# Patient Record
Sex: MALE | Race: WHITE | NOT HISPANIC OR LATINO | Employment: OTHER | URBAN - NONMETROPOLITAN AREA
[De-identification: names, ages, dates, MRNs, and addresses within clinical notes are randomized per-mention and may not be internally consistent; named-entity substitution may affect disease eponyms.]

---

## 2018-06-25 ENCOUNTER — TRANSCRIBE ORDERS (OUTPATIENT)
Dept: PHYSICAL THERAPY | Facility: CLINIC | Age: 68
End: 2018-06-25

## 2018-06-25 DIAGNOSIS — M25.511 RIGHT SHOULDER PAIN, UNSPECIFIED CHRONICITY: Primary | ICD-10-CM

## 2018-06-26 ENCOUNTER — CONSULT (OUTPATIENT)
Dept: PHYSICAL THERAPY | Facility: CLINIC | Age: 68
End: 2018-06-26

## 2018-06-26 DIAGNOSIS — M25.511 RIGHT SHOULDER PAIN, UNSPECIFIED CHRONICITY: Primary | ICD-10-CM

## 2018-06-26 PROCEDURE — G8985 CARRY GOAL STATUS: HCPCS | Performed by: PHYSICAL THERAPIST

## 2018-06-26 PROCEDURE — G8984 CARRY CURRENT STATUS: HCPCS | Performed by: PHYSICAL THERAPIST

## 2018-06-26 PROCEDURE — 97110 THERAPEUTIC EXERCISES: CPT | Performed by: PHYSICAL THERAPIST

## 2018-06-26 PROCEDURE — G0283 ELEC STIM OTHER THAN WOUND: HCPCS | Performed by: PHYSICAL THERAPIST

## 2018-06-26 PROCEDURE — 97161 PT EVAL LOW COMPLEX 20 MIN: CPT | Performed by: PHYSICAL THERAPIST

## 2018-06-26 NOTE — PROGRESS NOTES
Outpatient Physical Therapy Ortho Initial Evaluation       Patient Name: Carlos Zapata  : 1950  MRN: [id number]  Today's Date: 2018      Visit Date: 2018  Visit   Return to MD: 18  Re-cert date: 18  TIME     TIME      There is no problem list on file for this patient.       Past Medical History:   Diagnosis Date   • Arthritis    • Diabetes mellitus    • Hx of heart artery stent    • Hypertension         No past surgical history on file.    Visit Dx:     ICD-10-CM ICD-9-CM   1. Right shoulder pain, unspecified chronicity M25.511 719.41     Meds: Aspirin, Simvastatin, lisinopril, nitrostat, sotalol, hydrochlorothiazide, metformin, glimepiride, Zetia, invokana   Allergies: NKA    Subjective Evaluation    History of Present Illness  Mechanism of injury: 67 yo male with 3 month h/o acute onset R shoulder pain. Insidious onset. Had trouble with laying on right side sleeping at night.       Patient Occupation: retired-[employer] Quality of life: good    Pain  Current pain rating: 3  At best pain ratin  At worst pain ratin  Location: right shoulder  Quality: sharp  Relieving factors: heat  Aggravating factors: overhead activity, outstretched reach, lifting and sleeping (dressing)  Progression: worsening    Social Support  Lives in: one-story house  Lives with: spouse    Hand dominance: left    Diagnostic Tests  X-ray: abnormal    Treatments  Previous treatment: physical therapy  Current treatment: medication and physical therapy  Patient Goals  Patient goals for therapy: increased strength, increased motion and decreased pain                  RUE AROM (degrees)  R Shoulder Flexion  0-170: 121 Degrees  R Shoulder ABduction 0-140: 72 Degrees  R Shoulder Internal Rotation  0-70: 40 Degrees  R Shoulder External Rotation  0-90: 54 Degrees     RUE Strength  R Shoulder Flexion: 4/5  R Shoulder ABduction: 4+/5  R Shoulder Internal Rotation: 5/5  R  "Shoulder External Rotation: 4+/5  R Elbow Flexion: 4/5  R Elbow Extension: 5/5        LUE AROM (degrees)  L Shoulder Flexion  0-170: 145 Degrees  L Shoulder ABduction 0-40: 125 Degrees  L Shoulder Internal Rotation  0-70: 69 Degrees  L Shoulder External Rotation  0-90: 55 Degrees     LUE Strength  L Shoulder Flexion: 5/5  L Shoulder ABduction: 5/5  L Shoulder Internal Rotation: 5/5  L Shoulder External Rotation: 5/5  L Elbow Flexion: 5/5  L Elbow Extension: 5/5                     Body Part  Body Part: Shoulder               Ice  Ice Applied: Yes  Location: R shoulder  Rx Minutes: 15 mins  Ice S/P Rx: Yes    Electrical Stimulation  Stimulation Type: IFC  Location/Electrode Placement/Other: R shoulder  Rx Minutes: 15 mins                   EXERCISE  Exercise 1  Exercise Name 1: supine R shoulder flex AAROM   Sets/Reps 1: 1/20  Exercise 2  Exercise Name 2: supine R shoulder ER/IR w/ dowel  Sets/Reps 2: 20x ea Exercise 3  Exercise Name 3: standing R shoulder ext w/ dowel  Sets/Reps 3: 1/10-increased R shoulder pain Exercise 4  Exercise Name 4: posterior capsule stretch  Sets/Reps 4: 2/30\" hold Exercise 5  Exercise Name 5: resisted R shoulder ext w/ red TB  Time 5: 1/20 Exercise 6  Exercise Name 6: resisted rows with red TB  Sets/Reps 6: 10x-d/c'd due to increased R biceps region pain         Therapeutic ex-12 minutes               Assessment & Plan     Assessment  Impairments: abnormal or restricted ROM, activity intolerance, impaired physical strength, lacks appropriate home exercise program and pain with function  Assessment details: Acute right shoulder pain with AC joint degenerative changes (osteophytes).  Prognosis: good  Functional Limitations: carrying objects, lifting, sleeping, pushing, uncomfortable because of pain and reaching behind back  Goals  Plan Goals: STG's (2-3 weeks)  1. Pt independent with HEP.  2. Improve right shoulder flex/abd AROM to 150° and 100° respectively.  3. Improve right shoulder MMT " to 4+/5.  4. Reduce right shoulder pain to 3-4/10 (at worst) level performing ADL's.  4. Improve right shoulder IR/ER to 55°/70°.    LTG's (4-6 weeks)  1. Improve right shoulder abduction AROM to 130°.  2. Improve right shoulder MMT to 5/5  3. Reduce right shoulder pain to 1-2/10 (at worst) level performing ADL's.  4. Improve right shoulder IR/ER to 70°/80°    Plan  Therapy options: will be seen for skilled physical therapy services  Planned modality interventions: cryotherapy, electrical stimulation/Russian stimulation, thermotherapy (hydrocollator packs) and ultrasound  Planned therapy interventions: manual therapy, postural training, soft tissue mobilization, spinal/joint mobilization, strengthening, stretching, joint mobilization, home exercise program and flexibility  Frequency: 2x week  Duration in weeks: 6  Treatment plan discussed with: patient  Plan details: Address RTC strengthening and improve R shoulder ROM.         Time Calculation: 61            PT G-Codes  Outcome Measure Options: Quick DASH  Score: 36.36  Functional Limitation: Carrying, moving and handling objects  Carrying, Moving and Handling Objects Current Status (): At least 20 percent but less than 40 percent impaired, limited or restricted  Carrying, Moving and Handling Objects Goal Status (): At least 1 percent but less than 20 percent impaired, limited or restricted         Karthik Mar, PT  6/26/2018        Carlos Zapata    1950

## 2018-06-28 ENCOUNTER — TREATMENT (OUTPATIENT)
Dept: PHYSICAL THERAPY | Facility: CLINIC | Age: 68
End: 2018-06-28

## 2018-06-28 DIAGNOSIS — M25.511 RIGHT SHOULDER PAIN, UNSPECIFIED CHRONICITY: Primary | ICD-10-CM

## 2018-06-28 PROCEDURE — G0283 ELEC STIM OTHER THAN WOUND: HCPCS | Performed by: PHYSICAL THERAPIST

## 2018-06-28 PROCEDURE — 97110 THERAPEUTIC EXERCISES: CPT | Performed by: PHYSICAL THERAPIST

## 2018-06-28 NOTE — PROGRESS NOTES
Daily Treatment Note    Time In: 14:46     Time Out: 15:46    ICD-10-CM ICD-9-CM   1. Right shoulder pain, unspecified chronicity M25.511 719.41       Subjective   Pt reports mild R shld pain. He is doing HEP.   Patient reports to therapy 1-2/10 pain, and 0% improvement.  Attendance  2/2 visits. Recert 7/17. MD f/u 7/20.      Objective     V cues necessary for correct TE performance. Post treatment pain decreased.    PROCEDURES AND MODALITIES:     Ice  Ice Applied: Yes  Location: R shoulder  Rx Minutes: 15 mins  Ice S/P Rx: Yes    Electrical Stimulation  Stimulation Type: IFC  Location/Electrode Placement/Other: R shoulder  Rx Minutes: 15 mins       EXERCISE  Exercise 1  Exercise Name 1: Man shld: post mob, inf glide mob, PROM  Time: 8'  Exercise 2  Exercise Name 2: Pulleys  Sets/Reps 2: 2/1' Exercise 3  Exercise Name 3: Bar flex, abd, ext,   Sets/Reps 3: 2/10x Exercise 4  Exercise Name 4: S/L ER  Equipment/Resistance 4: 1#  Sets/Reps 4: 2 sets Exercise 5  Exercise Name 5: Horz abd over PB  Equipment/Resistance 5: 1#  Sets/Reps 5: 2/10x Exercise 6  Exercise Name 6: Seated thoracic ext  Sets/Reps 6: 2/10x   Exercise 7  Exercise Name 7: S/L thoracic rot  Sets/Reps 7: 2/10x ea                                         Therapy Exercise [id number] 45 minutes and Other Procedure CPT 15 minutes Electrical Stimulation    Total Treatment Time: 60 Minutes    Assessment/Plan   Good tolerance of today's treatment. ROM needs increasing. Pt continues to benefit from PT for further progression towards goals.  Progress per Plan of Care             Lucius Milner, PTA  Physical Therapist

## 2018-07-03 ENCOUNTER — TREATMENT (OUTPATIENT)
Dept: PHYSICAL THERAPY | Facility: CLINIC | Age: 68
End: 2018-07-03

## 2018-07-03 DIAGNOSIS — M25.511 RIGHT SHOULDER PAIN, UNSPECIFIED CHRONICITY: Primary | ICD-10-CM

## 2018-07-03 PROCEDURE — 97110 THERAPEUTIC EXERCISES: CPT | Performed by: PHYSICAL THERAPIST

## 2018-07-03 PROCEDURE — G0283 ELEC STIM OTHER THAN WOUND: HCPCS | Performed by: PHYSICAL THERAPIST

## 2018-07-03 NOTE — PROGRESS NOTES
"Daily Treatment Note    Time In: 13:55      Time Out: 14:53    ICD-10-CM ICD-9-CM   1. Right shoulder pain, unspecified chronicity M25.511 719.41       Subjective   Pt reports having bad night last night with pain. No improvement reported thus far.   Patient reports to therapy 5/10 pain, and 0% improvement.  Attendance  3/3 visits. Recert 7/17. MD f/u 7/20.      Objective    C/O ant shld pain with attempt @ rope rows. Post treatment pain decreased.      PROCEDURES AND MODALITIES:     Ice  Ice Applied: Yes  Location: R shoulder  Rx Minutes: 15 mins  Ice S/P Rx: Yes    Electrical Stimulation  Stimulation Type: IFC  Location/Electrode Placement/Other: R shoulder  Rx Minutes: 15 mins       EXERCISE  Exercise 1  Exercise Name 1: Man shld: PROM, mobs  Time: 12'  Exercise 2  Exercise Name 2: Manual pec minor stretch  Sets/Reps 2: 2/30\" Exercise 3  Exercise Name 3: Supine protraction  Equipment/Resistance 3: 5# bar  Sets/Reps 3: 30x Exercise 4  Exercise Name 4: Supine horz abd  Equipment/Resistance 4: red tb  Sets/Reps 4: 2/10x Exercise 5  Exercise Name 5: S/L horz abd  Equipment/Resistance 5: 1#  Sets/Reps 5: 2 sets Exercise 6  Exercise Name 6: Pulleys  Sets/Reps 6: 2'   Exercise 7  Exercise Name 7: Rows  Equipment/Resistance 7: green tb  Sets/Reps 7: 20x Exercise 8  Exercise Name 8: Seated thoracic ext  Sets/Reps 8: 2/10x                                         Therapy Exercise [id number] 43 minutes and Other Procedure CPT 15 minutes Electrical Stimulation    Total Treatment Time: 58 Minutes    Assessment/Plan   Mildy increased shld reactivity today. Adjusted TE 2° this. Pt continues to benefit from PT for further progression towards goals.  Progress per Plan of Care             Lucius Milner, PTA  Physical Therapist    "

## 2018-07-05 ENCOUNTER — TREATMENT (OUTPATIENT)
Dept: PHYSICAL THERAPY | Facility: CLINIC | Age: 68
End: 2018-07-05

## 2018-07-05 DIAGNOSIS — M25.511 RIGHT SHOULDER PAIN, UNSPECIFIED CHRONICITY: Primary | ICD-10-CM

## 2018-07-05 PROCEDURE — G0283 ELEC STIM OTHER THAN WOUND: HCPCS | Performed by: PHYSICAL THERAPIST

## 2018-07-05 PROCEDURE — 97110 THERAPEUTIC EXERCISES: CPT | Performed by: PHYSICAL THERAPIST

## 2018-07-05 NOTE — PROGRESS NOTES
"Daily Treatment Note    Time In: 8:00      Time Out: 8:55    ICD-10-CM ICD-9-CM   1. Right shoulder pain, unspecified chronicity M25.511 719.41       Subjective   Pt reports continued shld pain. No significant status change thus far.   Patient reports to therapy 5/10 pain, and  0% improvement.  Attendance  4/4 visits. Recert 7/17. MD f/u 7/20.      Objective     V cues necessary for correct TE performance. Compensatory patterns observed with horz abd ex.     AROM:    Shld flex 125°, abd 90°     PROCEDURES AND MODALITIES:        Ice  Ice Applied: Yes  Location: R shoulder  Rx Minutes: 15 mins  Ice S/P Rx: Yes    Electrical Stimulation  Stimulation Type: IFC  Location/Electrode Placement/Other: R shoulder  Rx Minutes: 15 mins    EXERCISE  Exercise 1  Exercise Name 1: PRO2  Equipment/Resistance 1: 3.4  Time: 6'  Exercise 2  Exercise Name 2: Manual pec minor stretch  Sets/Reps 2: 2/30\" Exercise 3  Exercise Name 3: Man shld: post mob, inf glide mob, IR/ER  Time 3: 7' Exercise 4  Exercise Name 4: Supine protraction  Equipment/Resistance 4: 5# db  Sets/Reps 4: 2/15x Exercise 5  Exercise Name 5: S/L ER  Equipment/Resistance 5: 2#  Sets/Reps 5: 2/15x Exercise 6  Exercise Name 6: ST horz abd  Equipment/Resistance 6: yellow tb  Sets/Reps 6: 2/10x   Exercise 7  Exercise Name 7: Incline CP  Equipment/Resistance 7: 4# db  Sets/Reps 7: 2 sets Exercise 8  Exercise Name 8: Doorway pec stretch  Sets/Reps 8: 2/30\" Exercise 9  Exercise Name 9: ST bar flexion  Equipment/Resistance 9: 1#  Sets/Reps 9: 2/10x                                     Therapy Exercise [id number] 40 minutes and Other Procedure CPT 15 minutes Electrical Stimulation    Total Treatment Time: 55 Minutes    Assessment/Plan   Mildly increased shld flexion measurement. Slow progress thus far. Good tolerance of today's treatment.   Progress per Plan of Care             Lucius Milner, PTA  Physical Therapist    "

## 2018-07-10 ENCOUNTER — TREATMENT (OUTPATIENT)
Dept: PHYSICAL THERAPY | Facility: CLINIC | Age: 68
End: 2018-07-10

## 2018-07-10 DIAGNOSIS — M25.511 RIGHT SHOULDER PAIN, UNSPECIFIED CHRONICITY: Primary | ICD-10-CM

## 2018-07-10 PROCEDURE — G0283 ELEC STIM OTHER THAN WOUND: HCPCS | Performed by: PHYSICAL THERAPIST

## 2018-07-10 PROCEDURE — 97110 THERAPEUTIC EXERCISES: CPT | Performed by: PHYSICAL THERAPIST

## 2018-07-10 PROCEDURE — 97140 MANUAL THERAPY 1/> REGIONS: CPT | Performed by: PHYSICAL THERAPIST

## 2018-07-10 NOTE — PROGRESS NOTES
"Daily Treatment Note    Time In: 1315     Time Out:  1412    ICD-10-CM ICD-9-CM   1. Right shoulder pain, unspecified chronicity M25.511 719.41       Subjective   Thinks he will need to save therapy in case he has surgery. Recert with Karthik next week for MD progress note.  Patient reports to therapy 2-3/10 pain, and  0% improvement.  Attendance  5/5 visits. Recert 7/17. MD f/u 7/20.      Objective        AROM:        90 abd, ER supine at 90° -80, IR at 70 abd- 45°    PROCEDURES AND MODALITIES:            Ice  Location: R shoulder  Rx Minutes: 15 mins  Ice S/P Rx: Yes    Electrical Stimulation  Stimulation Type: IFC  Location/Electrode Placement/Other: R shoulder  Rx Minutes: 15 mins            EXERCISE    30  mins  Exercise 1  Exercise Name 1: PRO2  Equipment/Resistance 1: 3.4  Time: 6'  Exercise 2  Exercise Name 2: doorway pec minor stretch  Sets/Reps 2: 2/30\" Exercise 3  Exercise Name 3: pulleys  Time 3: 3' Exercise 4  Exercise Name 4: Supine protraction  Sets/Reps 4: 2/15x Exercise 5  Exercise Name 5: S/L ER  Equipment/Resistance 5: 2#  Sets/Reps 5: 2/15x Exercise 6  Exercise Name 6: ST horz abd  Equipment/Resistance 6: yellow tb  Sets/Reps 6: 2/10x   Exercise 7  Exercise Name 7: sleeper stretch  Sets/Reps 7: 3/30 Exercise 8  Exercise Name 8: towel roll pec stretch  Sets/Reps 8: 3' Exercise 9  Exercise Name 9: Supine bar flexion  Equipment/Resistance 9: 1#  Sets/Reps 9: 2/10x  no $ yellow band- 20x                                   MANUAL PT:  Manual Rx 1 Location: GH joint  Manual Rx 1 Type: mobes  Manual Rx 1 Grade: 3-4  Manual Rx 1 Duration: 5'  Manual Rx 2 Location: scapular glides  Manual Rx 2 Duration: 3'  Manual Rx 3 Location: bicep/ UT  Manual Rx 3 Type: MFR  Manual Rx 3 Duration: 2'           Assessment/Plan   Progress ROM and strength as able             Iman Wilson, PT, ATC, DPT  Physical Therapist    "

## 2018-07-12 ENCOUNTER — TREATMENT (OUTPATIENT)
Dept: PHYSICAL THERAPY | Facility: CLINIC | Age: 68
End: 2018-07-12

## 2018-07-12 DIAGNOSIS — M25.511 RIGHT SHOULDER PAIN, UNSPECIFIED CHRONICITY: Primary | ICD-10-CM

## 2018-07-12 PROCEDURE — 97110 THERAPEUTIC EXERCISES: CPT | Performed by: PHYSICAL THERAPIST

## 2018-07-12 PROCEDURE — G0283 ELEC STIM OTHER THAN WOUND: HCPCS | Performed by: PHYSICAL THERAPIST

## 2018-07-12 NOTE — PROGRESS NOTES
Daily Treatment Note    Time In: 7:56      Time Out: 9:02    ICD-10-CM ICD-9-CM   1. Right shoulder pain, unspecified chronicity M25.511 719.41       Subjective   Pt reports feeling the same. He will f/u with MD next week.   Patient reports to therapy 3/10 pain, and  0% improvement.  Attendance  6/6 visits. Recert 7/17. MD f/u 7/20.      Objective     V cues necessary for correct TE performance.    PROCEDURES AND MODALITIES:     Ice  Ice Applied: Yes  Location: R shoulder  Rx Minutes: 15 mins  Ice S/P Rx: Yes    Electrical Stimulation  Stimulation Type: IFC  Max mAmp: 8  Location/Electrode Placement/Other: R shoulder  Rx Minutes: 15 mins       EXERCISE  Exercise 1  Exercise Name 1: PRO2  Equipment/Resistance 1: 4.0  Time: 8'  Exercise 2  Exercise Name 2: Pulleys  Sets/Reps 2: 20x Exercise 3  Exercise Name 3: Man shld: distract, post mob, inf glige mob, PROM, pec minor stretch  Time 3: 18' Exercise 4  Exercise Name 4: Isometric @ end range: IR, ER, flex  Sets/Reps 4: 10x ea Exercise 5  Exercise Name 5: S/L ER  Sets/Reps 5: 30x Exercise 6  Exercise Name 6: B shld ext  Equipment/Resistance 6: yellow tb  Sets/Reps 6: 30x   Exercise 7  Exercise Name 7: Seated thoracic ext  Equipment/Resistance 7: 2 balls  Sets/Reps 7: 3/10x                                           Therapy Exercise [id number] 51 minutes and Other Procedure CPT 15 minutes Electrical Stimulation    Total Treatment Time: 66 Minutes    Assessment/Plan   Min significant status change. Pt to possibly have ortho consult.   Other. MD f/u.              Lucius Milner, PTA  Physical Therapist

## 2018-07-17 ENCOUNTER — OFFICE VISIT (OUTPATIENT)
Dept: ORTHOPEDIC SURGERY | Facility: CLINIC | Age: 68
End: 2018-07-17

## 2018-07-17 VITALS — BODY MASS INDEX: 25.74 KG/M2 | HEIGHT: 75 IN | WEIGHT: 207 LBS

## 2018-07-17 DIAGNOSIS — M75.41 IMPINGEMENT SYNDROME OF RIGHT SHOULDER: Primary | ICD-10-CM

## 2018-07-17 DIAGNOSIS — M25.511 ACUTE PAIN OF RIGHT SHOULDER: ICD-10-CM

## 2018-07-17 PROBLEM — G89.29 CHRONIC RIGHT SHOULDER PAIN: Status: ACTIVE | Noted: 2018-07-17

## 2018-07-17 PROCEDURE — 99214 OFFICE O/P EST MOD 30 MIN: CPT | Performed by: NURSE PRACTITIONER

## 2018-07-17 RX ORDER — LORATADINE 10 MG/1
10 TABLET ORAL AS NEEDED
COMMUNITY

## 2018-07-17 RX ORDER — LISINOPRIL 10 MG/1
10 TABLET ORAL DAILY
COMMUNITY
Start: 2018-06-25 | End: 2019-06-26

## 2018-07-17 RX ORDER — NITROGLYCERIN 0.4 MG/1
TABLET SUBLINGUAL
COMMUNITY
Start: 2017-06-05 | End: 2018-08-30

## 2018-07-17 RX ORDER — GLIMEPIRIDE 4 MG/1
4 TABLET ORAL NIGHTLY
COMMUNITY
Start: 2018-01-24

## 2018-07-17 RX ORDER — HYDROCHLOROTHIAZIDE 25 MG/1
TABLET ORAL
COMMUNITY
Start: 2018-04-10 | End: 2018-08-30

## 2018-07-17 RX ORDER — EZETIMIBE 10 MG/1
10 TABLET ORAL
COMMUNITY
Start: 2018-04-24 | End: 2018-07-24

## 2018-07-17 RX ORDER — SIMVASTATIN 80 MG
80 TABLET ORAL NIGHTLY
COMMUNITY
Start: 2018-07-09 | End: 2019-05-22

## 2018-07-17 RX ORDER — ASPIRIN 325 MG
325 TABLET ORAL NIGHTLY
COMMUNITY

## 2018-07-17 RX ORDER — SOTALOL HYDROCHLORIDE 80 MG/1
80 TABLET ORAL 2 TIMES DAILY
COMMUNITY
Start: 2018-06-25 | End: 2019-06-26

## 2018-07-17 NOTE — PROGRESS NOTES
Carlos Zapata is a 68 y.o. male   Primary provider:  Noman Meyers MD       Chief Complaint   Patient presents with   • Right Shoulder - Shoulder Pain       HISTORY OF PRESENT ILLNESS: right shoulder pain started about 5 months ago, no injury. Patient seen by Dr. Nam had xrays and referred to physical therapy at [facility].     Upper Extremity Issue   This is a chronic problem. The current episode started more than 1 month ago. The problem occurs intermittently. The problem has been unchanged. Associated symptoms comments: Stabbing, aching. . He has tried NSAIDs and ice (physical therapy) for the symptoms.        CONCURRENT MEDICAL HISTORY:    Past Medical History:   Diagnosis Date   • Arthritis    • Diabetes mellitus (CMS/HCC)    • Hx of heart artery stent 2000   • Hypertension        No Known Allergies      Current Outpatient Prescriptions:   •  aspirin 325 MG tablet, Take 325 mg by mouth., Disp: , Rfl:   •  Canagliflozin (INVOKANA) 100 MG tablet, Take 100 mg by mouth., Disp: , Rfl:   •  ezetimibe (ZETIA) 10 MG tablet, Take 10 mg by mouth., Disp: , Rfl:   •  glimepiride (AMARYL) 4 MG tablet, , Disp: , Rfl:   •  hydrochlorothiazide (HYDRODIURIL) 25 MG tablet, Take 1/2 tablet every morning, Disp: , Rfl:   •  lisinopril (PRINIVIL,ZESTRIL) 10 MG tablet, Take 10 mg by mouth., Disp: , Rfl:   •  loratadine (CLARITIN) 10 MG tablet, Take 10 mg by mouth., Disp: , Rfl:   •  metFORMIN (GLUCOPHAGE) 1000 MG tablet, Take 1,000 mg by mouth., Disp: , Rfl:   •  nitroglycerin (NITROSTAT) 0.4 MG SL tablet, Dissolve 1 tablet under the tongue every 5 minutes up  to 3 doses in 15 min period as needed for chest pain.  Call 911 if unresolved, Disp: , Rfl:   •  simvastatin (ZOCOR) 80 MG tablet, Take 80 mg by mouth., Disp: , Rfl:   •  sotalol (BETAPACE) 80 MG tablet, Take 80 mg by mouth., Disp: , Rfl:     Past Surgical History:   Procedure Laterality Date   • KNEE SURGERY Right 2015?   • SHOULDER SURGERY Left 2015?  "  • TONSILLECTOMY  1958       Family History   Problem Relation Age of Onset   • Hypertension Other    • Diabetes Other         Social History     Social History   • Marital status:      Spouse name: N/A   • Number of children: N/A   • Years of education: N/A     Occupational History   • Not on file.     Social History Main Topics   • Smoking status: Never Smoker   • Smokeless tobacco: Never Used   • Alcohol use No   • Drug use: No   • Sexual activity: Not on file     Other Topics Concern   • Not on file     Social History Narrative   • No narrative on file        Review of Systems   Constitutional: Negative.    HENT: Negative.    Eyes: Negative.    Respiratory: Negative.    Cardiovascular: Negative.    Gastrointestinal: Negative.    Endocrine: Negative.    Genitourinary: Negative.    Musculoskeletal: Negative.    Skin: Negative.    Allergic/Immunologic: Negative.    Neurological: Negative.    Hematological: Negative.    Psychiatric/Behavioral: Negative.        PHYSICAL EXAMINATION:       Ht 189.2 cm (74.5\")   Wt 93.9 kg (207 lb)   BMI 26.22 kg/m²     Physical Exam   Constitutional: He is oriented to person, place, and time. Vital signs are normal. He appears well-developed and well-nourished. He is cooperative.   HENT:   Head: Normocephalic and atraumatic.   Neck: Trachea normal and phonation normal.   Pulmonary/Chest: Effort normal. No respiratory distress.   Abdominal: Soft. Normal appearance. He exhibits no distension.   Neurological: He is alert and oriented to person, place, and time. GCS eye subscore is 4. GCS verbal subscore is 5. GCS motor subscore is 6.   Skin: Skin is warm, dry and intact.   Psychiatric: He has a normal mood and affect. His speech is normal and behavior is normal. Judgment and thought content normal. Cognition and memory are normal.   Vitals reviewed.      GAIT:     [x]  Normal  []  Antalgic    Assistive device: [x]  None  []  Walker     []  Crutches  []  Cane     []  " Wheelchair  []  Stretcher    Right Shoulder Exam     Tenderness   The patient is experiencing tenderness in the acromioclavicular joint.    Range of Motion   Active Abduction: abnormal   Forward Flexion: abnormal   External Rotation: abnormal   Internal Rotation 90 degrees: abnormal     Muscle Strength   Abduction: 4/5   Internal Rotation: 4/5   External Rotation: 4/5   Supraspinatus: 4/5     Tests   Drop Arm: positive  Impingement: positive    Other   Erythema: absent  Scars: absent  Sensation: normal  Pulse: present      Left Shoulder Exam   Left shoulder exam is normal.              X-ray shoulder right 2 or more views6/20/2018  [facility]  Result Impression     Negative right shoulder.    [id number]   Result Narrative   Right shoulder, 3 views    Indication: Pain, decreased range of motion.    Indication: No fracture or dislocation is seen in the right shoulder. The glenohumeral and acromioclavicular joints are unremarkable. Included upper right ribs are within normal limits. A small osteophyte is seen at the inferomedial humeral head. Tiny   osteophytes are seen at the AC joint.   Other Result Information   Zach, Rad Results In - 06/20/2018  3:24 PM CDT  Right shoulder, 3 views    Indication: Pain, decreased range of motion.    Indication: No fracture or dislocation is seen in the right shoulder. The glenohumeral and acromioclavicular joints are unremarkable. Included upper right ribs are within normal limits. A small osteophyte is seen at the inferomedial humeral head. Tiny   osteophytes are seen at the AC joint.    IMPRESSION:     Negative right shoulder.    [id number]             ASSESSMENT:    Diagnoses and all orders for this visit:    Impingement syndrome of right shoulder  -     MRI Shoulder Right Without Contrast; Future    Acute pain of right shoulder  -     MRI Shoulder Right Without Contrast; Future    Other orders  -     aspirin 325 MG tablet; Take 325 mg by mouth.  -     loratadine  (CLARITIN) 10 MG tablet; Take 10 mg by mouth.  -     simvastatin (ZOCOR) 80 MG tablet; Take 80 mg by mouth.  -     lisinopril (PRINIVIL,ZESTRIL) 10 MG tablet; Take 10 mg by mouth.  -     nitroglycerin (NITROSTAT) 0.4 MG SL tablet; Dissolve 1 tablet under the tongue every 5 minutes up  to 3 doses in 15 min period as needed for chest pain.  Call 911 if unresolved  -     Canagliflozin (INVOKANA) 100 MG tablet; Take 100 mg by mouth.  -     sotalol (BETAPACE) 80 MG tablet; Take 80 mg by mouth.  -     hydrochlorothiazide (HYDRODIURIL) 25 MG tablet; Take 1/2 tablet every morning  -     metFORMIN (GLUCOPHAGE) 1000 MG tablet; Take 1,000 mg by mouth.  -     glimepiride (AMARYL) 4 MG tablet;   -     ezetimibe (ZETIA) 10 MG tablet; Take 10 mg by mouth.          PLAN  Recommend MRI of the right shoulder to rule out a rotator cuff tear.  No Follow-up on file.    Tan Rodriguez, APRN

## 2018-07-25 DIAGNOSIS — M25.511 ACUTE PAIN OF RIGHT SHOULDER: ICD-10-CM

## 2018-07-25 DIAGNOSIS — M75.41 IMPINGEMENT SYNDROME OF RIGHT SHOULDER: ICD-10-CM

## 2018-07-30 ENCOUNTER — OFFICE VISIT (OUTPATIENT)
Dept: ORTHOPEDIC SURGERY | Facility: CLINIC | Age: 68
End: 2018-07-30

## 2018-07-30 VITALS — WEIGHT: 205 LBS | BODY MASS INDEX: 25.49 KG/M2 | HEIGHT: 75 IN

## 2018-07-30 DIAGNOSIS — M75.41 IMPINGEMENT SYNDROME OF RIGHT SHOULDER: Primary | ICD-10-CM

## 2018-07-30 DIAGNOSIS — M25.511 ACUTE PAIN OF RIGHT SHOULDER: ICD-10-CM

## 2018-07-30 PROCEDURE — 20610 DRAIN/INJ JOINT/BURSA W/O US: CPT | Performed by: NURSE PRACTITIONER

## 2018-07-30 PROCEDURE — 99213 OFFICE O/P EST LOW 20 MIN: CPT | Performed by: NURSE PRACTITIONER

## 2018-07-30 RX ADMIN — LIDOCAINE HYDROCHLORIDE 2 ML: 20 INJECTION, SOLUTION INFILTRATION; PERINEURAL at 13:30

## 2018-07-30 RX ADMIN — TRIAMCINOLONE ACETONIDE 40 MG: 40 INJECTION, SUSPENSION INTRA-ARTICULAR; INTRAMUSCULAR at 13:30

## 2018-07-30 NOTE — PROGRESS NOTES
"Carlos Zapata is a 68 y.o. male returns for     Chief Complaint   Patient presents with   • Right Shoulder - Follow-up, Pain   • Results     mri right shoulder done on 7/23/2018 at [facility].        HISTORY OF PRESENT ILLNESS:       CONCURRENT MEDICAL HISTORY:    Past Medical History:   Diagnosis Date   • Arthritis    • Diabetes mellitus (CMS/HCC)    • Hx of heart artery stent 2000   • Hypertension        No Known Allergies      Current Outpatient Prescriptions:   •  aspirin 325 MG tablet, Take 325 mg by mouth., Disp: , Rfl:   •  Canagliflozin (INVOKANA) 100 MG tablet, Take 100 mg by mouth., Disp: , Rfl:   •  glimepiride (AMARYL) 4 MG tablet, , Disp: , Rfl:   •  hydrochlorothiazide (HYDRODIURIL) 25 MG tablet, Take 1/2 tablet every morning, Disp: , Rfl:   •  lisinopril (PRINIVIL,ZESTRIL) 10 MG tablet, Take 10 mg by mouth., Disp: , Rfl:   •  loratadine (CLARITIN) 10 MG tablet, Take 10 mg by mouth., Disp: , Rfl:   •  metFORMIN (GLUCOPHAGE) 1000 MG tablet, Take 1,000 mg by mouth., Disp: , Rfl:   •  nitroglycerin (NITROSTAT) 0.4 MG SL tablet, Dissolve 1 tablet under the tongue every 5 minutes up  to 3 doses in 15 min period as needed for chest pain.  Call 911 if unresolved, Disp: , Rfl:   •  simvastatin (ZOCOR) 80 MG tablet, Take 80 mg by mouth., Disp: , Rfl:   •  sotalol (BETAPACE) 80 MG tablet, Take 80 mg by mouth., Disp: , Rfl:     Past Surgical History:   Procedure Laterality Date   • KNEE SURGERY Right 2015?   • SHOULDER SURGERY Left 2015?   • TONSILLECTOMY  1958       ROS  No fevers or chills.  No chest pain or shortness of air.  No GI or  disturbances.    PHYSICAL EXAMINATION:       Ht 189.2 cm (74.5\")   Wt 93 kg (205 lb)   BMI 25.97 kg/m²     Physical Exam   Constitutional: He is oriented to person, place, and time. Vital signs are normal. He appears well-developed and well-nourished. He is cooperative.   HENT:   Head: Normocephalic and atraumatic.   Neck: Trachea normal and phonation normal. "   Pulmonary/Chest: Effort normal. No respiratory distress.   Abdominal: Soft. Normal appearance. He exhibits no distension.   Neurological: He is alert and oriented to person, place, and time. GCS eye subscore is 4. GCS verbal subscore is 5. GCS motor subscore is 6.   Skin: Skin is warm, dry and intact.   Psychiatric: He has a normal mood and affect. His speech is normal and behavior is normal. Judgment and thought content normal. Cognition and memory are normal.   Vitals reviewed.      GAIT:     [x]  Normal  []  Antalgic    Assistive device: [x]  None  []  Walker     []  Crutches  []  Cane     []  Wheelchair  []  Stretcher    Right Shoulder Exam     Tenderness   The patient is experiencing tenderness in the acromioclavicular joint.    Range of Motion   Active Abduction: abnormal   Forward Flexion: abnormal   External Rotation: abnormal   Internal Rotation 90 degrees: abnormal     Muscle Strength   Abduction: 4/5   Internal Rotation: 4/5   External Rotation: 4/5   Supraspinatus: 4/5     Tests   Drop Arm: positive  Impingement: positive    Other   Erythema: absent  Scars: absent  Sensation: normal  Pulse: present      Left Shoulder Exam   Left shoulder exam is normal.              Mri right shoulder  Impression  Labral tear           ASSESSMENT:    Diagnoses and all orders for this visit:    Impingement syndrome of right shoulder  -     Large Joint Arthrocentesis    Acute pain of right shoulder  -     Large Joint Arthrocentesis    Large Joint Arthrocentesis  Date/Time: 7/30/2018 1:30 PM  Consent given by: patient  Site marked: site marked  Timeout: Immediately prior to procedure a time out was called to verify the correct patient, procedure, equipment, support staff and site/side marked as required   Supporting Documentation  Indications: pain   Procedure Details  Location: shoulder - R glenohumeral  Preparation: Patient was prepped and draped in the usual sterile fashion  Needle size: 22 G  Approach:  anteromedial  Medications administered: 40 mg triamcinolone acetonide 40 MG/ML; 2 mL lidocaine 2%  Patient tolerance: patient tolerated the procedure well with no immediate complications              PLAN  Reviewed the MRI and report with the patient and his wife and discussed treatment options in detail.  I recommended an intra-articular injection of steroids today prescription of range of motion exercises as tolerated.  He will follow-up with Dr. Hamilton in 4-6 weeks for recheck  No Follow-up on file.    Tan Rodriguez, APRN

## 2018-07-31 RX ORDER — TRIAMCINOLONE ACETONIDE 40 MG/ML
40 INJECTION, SUSPENSION INTRA-ARTICULAR; INTRAMUSCULAR
Status: COMPLETED | OUTPATIENT
Start: 2018-07-30 | End: 2018-07-30

## 2018-07-31 RX ORDER — LIDOCAINE HYDROCHLORIDE 20 MG/ML
2 INJECTION, SOLUTION INFILTRATION; PERINEURAL
Status: COMPLETED | OUTPATIENT
Start: 2018-07-30 | End: 2018-07-30

## 2018-08-08 ENCOUNTER — OFFICE VISIT (OUTPATIENT)
Dept: ORTHOPEDIC SURGERY | Facility: CLINIC | Age: 68
End: 2018-08-08

## 2018-08-08 VITALS — HEIGHT: 75 IN | WEIGHT: 200 LBS | BODY MASS INDEX: 24.87 KG/M2

## 2018-08-08 DIAGNOSIS — M25.511 ACUTE PAIN OF RIGHT SHOULDER: ICD-10-CM

## 2018-08-08 DIAGNOSIS — I10 ESSENTIAL HYPERTENSION: ICD-10-CM

## 2018-08-08 DIAGNOSIS — M75.41 IMPINGEMENT SYNDROME OF RIGHT SHOULDER: Primary | ICD-10-CM

## 2018-08-08 DIAGNOSIS — E11.9 TYPE 2 DIABETES MELLITUS WITHOUT COMPLICATION, WITHOUT LONG-TERM CURRENT USE OF INSULIN (HCC): ICD-10-CM

## 2018-08-08 PROCEDURE — 99214 OFFICE O/P EST MOD 30 MIN: CPT | Performed by: ORTHOPAEDIC SURGERY

## 2018-08-08 RX ORDER — IBUPROFEN 800 MG/1
800 TABLET ORAL 3 TIMES DAILY
Qty: 90 TABLET | Refills: 1 | Status: SHIPPED | OUTPATIENT
Start: 2018-08-08 | End: 2018-09-18

## 2018-08-08 NOTE — PROGRESS NOTES
"Carlos Zapata is a 68 y.o. male returns for     Chief Complaint   Patient presents with   • Right Shoulder - Follow-up, Pain       HISTORY OF PRESENT ILLNESS: right shoulder pain started about 6 months ago no injury, patient was seen by ROHIT Rodriguez steroid injection done on 7/30/2018, helped a little. Physical therapy at [facility].     Having pain on top of shoulder and down the side of arm to mid humerus area.  No specific injury.  No numbness or tingling.  Pain at night, wakes him up.  Mostly dull ache but occasional sharp pains  Pain with activity of daily living.  Has had prior injections and physical therapy.    Patient is unhappy with quality of life.     CONCURRENT MEDICAL HISTORY:    The following portions of the patient's history were reviewed and updated as appropriate: allergies, current medications, past family history, past medical history, past social history, past surgical history and problem list.     ROS  No fevers or chills.  No chest pain or shortness of air.  No GI or  disturbances.  All other systems reviewed as negative except right shoulder pain.    PHYSICAL EXAMINATION:       Ht 189.2 cm (74.5\")   Wt 90.7 kg (200 lb)   BMI 25.33 kg/m²     Physical Exam   Constitutional: He is oriented to person, place, and time. He appears well-developed and well-nourished. No distress.   Cardiovascular: Normal rate, regular rhythm and normal heart sounds.    Pulmonary/Chest: Effort normal and breath sounds normal.   Abdominal: Soft. Bowel sounds are normal.   Neurological: He is alert and oriented to person, place, and time.   Psychiatric: He has a normal mood and affect. His behavior is normal. Judgment and thought content normal.   Vitals reviewed.      GAIT:     [x]  Normal  []  Antalgic    Assistive device: [x]  None  []  Walker     []  Crutches  []  Cane     []  Wheelchair  []  Stretcher    Right Shoulder Exam     Tenderness   The patient is experiencing tenderness in the " acromioclavicular joint and acromion.    Range of Motion   Active Abduction: 80   Forward Flexion: 110     Muscle Strength   Abduction: 4/5   Supraspinatus: 4/5     Tests   Cross Arm: positive  Hawkin's test: positive  Impingement: positive    Other   Erythema: absent  Sensation: normal  Pulse: present      Left Shoulder Exam     Tenderness   The patient is experiencing no tenderness.         Range of Motion   The patient has normal left shoulder ROM.    Muscle Strength   The patient has normal left shoulder strength.    Tests   Hawkin's test: negative  Impingement: negative    Other   Erythema: absent  Sensation: normal  Pulse: present               X-ray shoulder right 2 or more views6/20/2018  [facility]  Result Impression     Negative right shoulder.    [id number]   Result Narrative   Right shoulder, 3 views    Indication: Pain, decreased range of motion.    Indication: No fracture or dislocation is seen in the right shoulder. The glenohumeral and acromioclavicular joints are unremarkable. Included upper right ribs are within normal limits. A small osteophyte is seen at the inferomedial humeral head. Tiny   osteophytes are seen at the AC joint.   Other Result Information   Zach, Rad Results In - 06/20/2018  3:24 PM CDT  Right shoulder, 3 views    Indication: Pain, decreased range of motion.    Indication: No fracture or dislocation is seen in the right shoulder. The glenohumeral and acromioclavicular joints are unremarkable. Included upper right ribs are within normal limits. A small osteophyte is seen at the inferomedial humeral head. Tiny   osteophytes are seen at the AC joint.    IMPRESSION:     Negative right shoulder.     Mri right shoulder @ [facility] 7/23/2018  Impression  Labral tear     --review of MRI   -- significant AC arthritis with impingement.  Supraspinatus tendonopathy.  Superior labral fraying with partial tear of biceps.      Last Hemoglobin A1C was 6.6 on  6/20/2018      ASSESSMENT:    Diagnoses and all orders for this visit:    Impingement syndrome of right shoulder  -     Case Request; Standing  -     ceFAZolin (ANCEF) 2 g in sodium chloride 0.9 % 100 mL IVPB; Infuse 2 g into a venous catheter 1 (One) Time.  -     acetaminophen (TYLENOL) tablet 975 mg; Take 3 tablets by mouth 1 (One) Time.  -     meloxicam (MOBIC) tablet 15 mg; Take 2 tablets by mouth 1 (One) Time.  -     Case Request    Acute pain of right shoulder  -     Case Request; Standing  -     ceFAZolin (ANCEF) 2 g in sodium chloride 0.9 % 100 mL IVPB; Infuse 2 g into a venous catheter 1 (One) Time.  -     acetaminophen (TYLENOL) tablet 975 mg; Take 3 tablets by mouth 1 (One) Time.  -     meloxicam (MOBIC) tablet 15 mg; Take 2 tablets by mouth 1 (One) Time.  -     Case Request    Essential hypertension  -     Case Request; Standing  -     ceFAZolin (ANCEF) 2 g in sodium chloride 0.9 % 100 mL IVPB; Infuse 2 g into a venous catheter 1 (One) Time.  -     acetaminophen (TYLENOL) tablet 975 mg; Take 3 tablets by mouth 1 (One) Time.  -     meloxicam (MOBIC) tablet 15 mg; Take 2 tablets by mouth 1 (One) Time.  -     Case Request    Type 2 diabetes mellitus without complication, without long-term current use of insulin (CMS/[facility])  -     Case Request; Standing  -     ceFAZolin (ANCEF) 2 g in sodium chloride 0.9 % 100 mL IVPB; Infuse 2 g into a venous catheter 1 (One) Time.  -     acetaminophen (TYLENOL) tablet 975 mg; Take 3 tablets by mouth 1 (One) Time.  -     meloxicam (MOBIC) tablet 15 mg; Take 2 tablets by mouth 1 (One) Time.  -     Case Request    Other orders  -     ibuprofen (ADVIL,MOTRIN) 800 MG tablet; Take 1 tablet by mouth 3 (Three) Times a Day.  -     Follow Anesthesia Guidelines / Standing Orders; Future  -     Obtain informed consent  -     Follow Anesthesia Guidelines / Standing Orders; Standing  -     NPO After Midnight  -     Nerve Block; Standing          PLAN    Plan arthroscopy of right shoulder  with SAD/FLORESITA and eval of rotator cuff, possible biceps tenotomy.    The patient voiced understanding of the risks, benefits, and alternative forms of treatment that were discussed and the patient consents to proceed with surgery.  All risks, benefits and alternatives were discussed.  Risks including to but not exclusive to anesthetic complications, including death, MI, CVA, infection, bleeding DVT, fracture, residual pain and need for future surgery.  This discussion was held with the patient by Anselmo Hamilton MD and all questions were answered.    His last A1C was less than 8.        Patient's Body mass index is 25.33 kg/m². BMI is within normal parameters. No follow-up required.      Return for Post-operative eval.    Anselmo Hamilton MD

## 2018-08-11 RX ORDER — BUPIVACAINE HCL/0.9 % NACL/PF 0.1 %
2 PLASTIC BAG, INJECTION (ML) EPIDURAL ONCE
Status: CANCELLED | OUTPATIENT
Start: 2018-09-05 | End: 2018-09-05

## 2018-08-11 RX ORDER — MELOXICAM 15 MG/1
15 TABLET ORAL ONCE
Status: CANCELLED | OUTPATIENT
Start: 2018-09-05 | End: 2018-09-05

## 2018-08-11 RX ORDER — ACETAMINOPHEN 325 MG/1
1000 TABLET ORAL ONCE
Status: CANCELLED | OUTPATIENT
Start: 2018-09-05 | End: 2018-09-05

## 2018-08-14 PROBLEM — I10 ESSENTIAL HYPERTENSION: Status: ACTIVE | Noted: 2018-08-14

## 2018-08-14 PROBLEM — E11.9 TYPE 2 DIABETES MELLITUS WITHOUT COMPLICATION, WITHOUT LONG-TERM CURRENT USE OF INSULIN: Status: ACTIVE | Noted: 2018-08-14

## 2018-08-30 ENCOUNTER — APPOINTMENT (OUTPATIENT)
Dept: PREADMISSION TESTING | Facility: HOSPITAL | Age: 68
End: 2018-08-30

## 2018-08-30 VITALS
WEIGHT: 202 LBS | RESPIRATION RATE: 18 BRPM | DIASTOLIC BLOOD PRESSURE: 74 MMHG | OXYGEN SATURATION: 100 % | BODY MASS INDEX: 25.12 KG/M2 | SYSTOLIC BLOOD PRESSURE: 142 MMHG | HEIGHT: 75 IN | HEART RATE: 70 BPM

## 2018-08-30 LAB
ANION GAP SERPL CALCULATED.3IONS-SCNC: 10 MMOL/L (ref 5–15)
BUN BLD-MCNC: 24 MG/DL (ref 7–21)
BUN/CREAT SERPL: 19.5 (ref 7–25)
CALCIUM SPEC-SCNC: 10 MG/DL (ref 8.4–10.2)
CHLORIDE SERPL-SCNC: 103 MMOL/L (ref 95–110)
CO2 SERPL-SCNC: 29 MMOL/L (ref 22–31)
CREAT BLD-MCNC: 1.23 MG/DL (ref 0.7–1.3)
GFR SERPL CREATININE-BSD FRML MDRD: 59 ML/MIN/1.73 (ref 49–113)
GLUCOSE BLD-MCNC: 100 MG/DL (ref 60–100)
POTASSIUM BLD-SCNC: 4.5 MMOL/L (ref 3.5–5.1)
SODIUM BLD-SCNC: 142 MMOL/L (ref 137–145)

## 2018-08-30 PROCEDURE — 36415 COLL VENOUS BLD VENIPUNCTURE: CPT

## 2018-08-30 PROCEDURE — 80048 BASIC METABOLIC PNL TOTAL CA: CPT | Performed by: ANESTHESIOLOGY

## 2018-08-30 RX ORDER — NITROGLYCERIN 0.4 MG/1
0.4 TABLET SUBLINGUAL
COMMUNITY

## 2018-08-30 RX ORDER — HYDROCHLOROTHIAZIDE 12.5 MG/1
12.5 TABLET ORAL DAILY
COMMUNITY

## 2018-08-30 RX ORDER — SODIUM CHLORIDE 9 MG/ML
1000 INJECTION, SOLUTION INTRAVENOUS CONTINUOUS
Status: CANCELLED | OUTPATIENT
Start: 2018-09-05

## 2018-08-30 RX ORDER — TADALAFIL 20 MG/1
20 TABLET ORAL DAILY PRN
COMMUNITY

## 2018-08-30 RX ORDER — EZETIMIBE 10 MG/1
10 TABLET ORAL DAILY
COMMUNITY

## 2018-09-04 ENCOUNTER — ANESTHESIA EVENT (OUTPATIENT)
Dept: PERIOP | Facility: HOSPITAL | Age: 68
End: 2018-09-04

## 2018-09-05 ENCOUNTER — ANESTHESIA (OUTPATIENT)
Dept: PERIOP | Facility: HOSPITAL | Age: 68
End: 2018-09-05

## 2018-09-05 ENCOUNTER — HOSPITAL ENCOUNTER (OUTPATIENT)
Facility: HOSPITAL | Age: 68
Setting detail: HOSPITAL OUTPATIENT SURGERY
Discharge: HOME | End: 2018-09-05
Attending: ORTHOPAEDIC SURGERY | Admitting: ORTHOPAEDIC SURGERY

## 2018-09-05 VITALS
WEIGHT: 197.75 LBS | TEMPERATURE: 97.7 F | RESPIRATION RATE: 18 BRPM | HEIGHT: 75 IN | DIASTOLIC BLOOD PRESSURE: 66 MMHG | BODY MASS INDEX: 24.59 KG/M2 | OXYGEN SATURATION: 99 % | SYSTOLIC BLOOD PRESSURE: 138 MMHG | HEART RATE: 77 BPM

## 2018-09-05 DIAGNOSIS — I10 ESSENTIAL HYPERTENSION: ICD-10-CM

## 2018-09-05 DIAGNOSIS — M75.41 IMPINGEMENT SYNDROME OF RIGHT SHOULDER: Primary | ICD-10-CM

## 2018-09-05 DIAGNOSIS — E11.9 TYPE 2 DIABETES MELLITUS WITHOUT COMPLICATION, WITHOUT LONG-TERM CURRENT USE OF INSULIN (HCC): ICD-10-CM

## 2018-09-05 DIAGNOSIS — M25.511 ACUTE PAIN OF RIGHT SHOULDER: ICD-10-CM

## 2018-09-05 DIAGNOSIS — M75.111 PARTIAL TEAR OF RIGHT ROTATOR CUFF: ICD-10-CM

## 2018-09-05 LAB
GLUCOSE BLDC GLUCOMTR-MCNC: 143 MG/DL (ref 70–130)
GLUCOSE BLDC GLUCOMTR-MCNC: 144 MG/DL (ref 70–130)

## 2018-09-05 PROCEDURE — 25010000002 PROPOFOL 10 MG/ML EMULSION: Performed by: NURSE ANESTHETIST, CERTIFIED REGISTERED

## 2018-09-05 PROCEDURE — 25010000002 NEOSTIGMINE 4 MG/4ML SOLUTION PREFILLED SYRINGE: Performed by: NURSE ANESTHETIST, CERTIFIED REGISTERED

## 2018-09-05 PROCEDURE — 29826 SHO ARTHRS SRG DECOMPRESSION: CPT | Performed by: ORTHOPAEDIC SURGERY

## 2018-09-05 PROCEDURE — 25010000002 EPINEPHRINE PER 0.1 MG: Performed by: ORTHOPAEDIC SURGERY

## 2018-09-05 PROCEDURE — 25010000002 ONDANSETRON PER 1 MG: Performed by: NURSE ANESTHETIST, CERTIFIED REGISTERED

## 2018-09-05 PROCEDURE — 29824 SHO ARTHRS SRG DSTL CLAVICLC: CPT | Performed by: ORTHOPAEDIC SURGERY

## 2018-09-05 PROCEDURE — 25010000002 FENTANYL CITRATE (PF) 100 MCG/2ML SOLUTION: Performed by: NURSE ANESTHETIST, CERTIFIED REGISTERED

## 2018-09-05 PROCEDURE — 82962 GLUCOSE BLOOD TEST: CPT

## 2018-09-05 PROCEDURE — 25010000002 MIDAZOLAM PER 1 MG: Performed by: NURSE ANESTHETIST, CERTIFIED REGISTERED

## 2018-09-05 RX ORDER — ACETAMINOPHEN 650 MG/1
650 SUPPOSITORY RECTAL ONCE AS NEEDED
Status: DISCONTINUED | OUTPATIENT
Start: 2018-09-05 | End: 2018-09-05 | Stop reason: HOSPADM

## 2018-09-05 RX ORDER — MIDAZOLAM HYDROCHLORIDE 1 MG/ML
INJECTION INTRAMUSCULAR; INTRAVENOUS AS NEEDED
Status: DISCONTINUED | OUTPATIENT
Start: 2018-09-05 | End: 2018-09-05 | Stop reason: SURG

## 2018-09-05 RX ORDER — GLYCOPYRROLATE 0.2 MG/ML
INJECTION INTRAMUSCULAR; INTRAVENOUS AS NEEDED
Status: DISCONTINUED | OUTPATIENT
Start: 2018-09-05 | End: 2018-09-05 | Stop reason: SURG

## 2018-09-05 RX ORDER — MELOXICAM 15 MG/1
15 TABLET ORAL ONCE
Status: COMPLETED | OUTPATIENT
Start: 2018-09-05 | End: 2018-09-05

## 2018-09-05 RX ORDER — NEOSTIGMINE METHYLSULFATE 4 MG/4 ML
SYRINGE (ML) INTRAVENOUS AS NEEDED
Status: DISCONTINUED | OUTPATIENT
Start: 2018-09-05 | End: 2018-09-05 | Stop reason: SURG

## 2018-09-05 RX ORDER — EPHEDRINE SULFATE 50 MG/ML
5 INJECTION, SOLUTION INTRAVENOUS ONCE AS NEEDED
Status: DISCONTINUED | OUTPATIENT
Start: 2018-09-05 | End: 2018-09-05 | Stop reason: HOSPADM

## 2018-09-05 RX ORDER — HYDROCODONE BITARTRATE AND ACETAMINOPHEN 7.5; 325 MG/1; MG/1
1 TABLET ORAL ONCE AS NEEDED
Status: DISCONTINUED | OUTPATIENT
Start: 2018-09-05 | End: 2018-09-05 | Stop reason: HOSPADM

## 2018-09-05 RX ORDER — FLUMAZENIL 0.1 MG/ML
0.2 INJECTION INTRAVENOUS AS NEEDED
Status: DISCONTINUED | OUTPATIENT
Start: 2018-09-05 | End: 2018-09-05 | Stop reason: HOSPADM

## 2018-09-05 RX ORDER — MEPERIDINE HYDROCHLORIDE 50 MG/ML
12.5 INJECTION INTRAMUSCULAR; INTRAVENOUS; SUBCUTANEOUS
Status: DISCONTINUED | OUTPATIENT
Start: 2018-09-05 | End: 2018-09-05 | Stop reason: HOSPADM

## 2018-09-05 RX ORDER — ACETAMINOPHEN 500 MG
500 TABLET ORAL EVERY 6 HOURS PRN
COMMUNITY
End: 2019-05-22

## 2018-09-05 RX ORDER — HYDROCODONE BITARTRATE AND ACETAMINOPHEN 7.5; 325 MG/1; MG/1
1 TABLET ORAL EVERY 4 HOURS PRN
Qty: 40 TABLET | Refills: 0 | Status: SHIPPED | OUTPATIENT
Start: 2018-09-05 | End: 2018-10-09

## 2018-09-05 RX ORDER — ONDANSETRON 2 MG/ML
4 INJECTION INTRAMUSCULAR; INTRAVENOUS ONCE AS NEEDED
Status: DISCONTINUED | OUTPATIENT
Start: 2018-09-05 | End: 2018-09-05 | Stop reason: HOSPADM

## 2018-09-05 RX ORDER — ONDANSETRON 2 MG/ML
INJECTION INTRAMUSCULAR; INTRAVENOUS AS NEEDED
Status: DISCONTINUED | OUTPATIENT
Start: 2018-09-05 | End: 2018-09-05 | Stop reason: SURG

## 2018-09-05 RX ORDER — ROCURONIUM BROMIDE 10 MG/ML
INJECTION, SOLUTION INTRAVENOUS AS NEEDED
Status: DISCONTINUED | OUTPATIENT
Start: 2018-09-05 | End: 2018-09-05 | Stop reason: SURG

## 2018-09-05 RX ORDER — BUPIVACAINE HCL/0.9 % NACL/PF 0.1 %
2 PLASTIC BAG, INJECTION (ML) EPIDURAL ONCE
Status: COMPLETED | OUTPATIENT
Start: 2018-09-05 | End: 2018-09-05

## 2018-09-05 RX ORDER — EPINEPHRINE 1 MG/ML
INJECTION, SOLUTION, CONCENTRATE INTRAVENOUS AS NEEDED
Status: DISCONTINUED | OUTPATIENT
Start: 2018-09-05 | End: 2018-09-05 | Stop reason: HOSPADM

## 2018-09-05 RX ORDER — LABETALOL HYDROCHLORIDE 5 MG/ML
5 INJECTION, SOLUTION INTRAVENOUS
Status: DISCONTINUED | OUTPATIENT
Start: 2018-09-05 | End: 2018-09-05 | Stop reason: HOSPADM

## 2018-09-05 RX ORDER — LIDOCAINE HYDROCHLORIDE 20 MG/ML
INJECTION, SOLUTION INFILTRATION; PERINEURAL AS NEEDED
Status: DISCONTINUED | OUTPATIENT
Start: 2018-09-05 | End: 2018-09-05 | Stop reason: SURG

## 2018-09-05 RX ORDER — FENTANYL CITRATE 50 UG/ML
INJECTION, SOLUTION INTRAMUSCULAR; INTRAVENOUS AS NEEDED
Status: DISCONTINUED | OUTPATIENT
Start: 2018-09-05 | End: 2018-09-05 | Stop reason: SURG

## 2018-09-05 RX ORDER — DIPHENHYDRAMINE HYDROCHLORIDE 50 MG/ML
12.5 INJECTION INTRAMUSCULAR; INTRAVENOUS
Status: DISCONTINUED | OUTPATIENT
Start: 2018-09-05 | End: 2018-09-05 | Stop reason: HOSPADM

## 2018-09-05 RX ORDER — PROPOFOL 10 MG/ML
VIAL (ML) INTRAVENOUS AS NEEDED
Status: DISCONTINUED | OUTPATIENT
Start: 2018-09-05 | End: 2018-09-05 | Stop reason: SURG

## 2018-09-05 RX ORDER — NALOXONE HCL 0.4 MG/ML
0.2 VIAL (ML) INJECTION AS NEEDED
Status: DISCONTINUED | OUTPATIENT
Start: 2018-09-05 | End: 2018-09-05 | Stop reason: HOSPADM

## 2018-09-05 RX ORDER — SODIUM CHLORIDE 9 MG/ML
1000 INJECTION, SOLUTION INTRAVENOUS CONTINUOUS
Status: DISCONTINUED | OUTPATIENT
Start: 2018-09-05 | End: 2018-09-05 | Stop reason: HOSPADM

## 2018-09-05 RX ORDER — ACETAMINOPHEN 325 MG/1
1000 TABLET ORAL ONCE
Status: COMPLETED | OUTPATIENT
Start: 2018-09-05 | End: 2018-09-05

## 2018-09-05 RX ORDER — ACETAMINOPHEN 325 MG/1
650 TABLET ORAL ONCE AS NEEDED
Status: DISCONTINUED | OUTPATIENT
Start: 2018-09-05 | End: 2018-09-05 | Stop reason: HOSPADM

## 2018-09-05 RX ORDER — PROMETHAZINE HYDROCHLORIDE 25 MG/ML
12.5 INJECTION, SOLUTION INTRAMUSCULAR; INTRAVENOUS ONCE AS NEEDED
Status: DISCONTINUED | OUTPATIENT
Start: 2018-09-05 | End: 2018-09-05 | Stop reason: HOSPADM

## 2018-09-05 RX ADMIN — GLYCOPYRROLATE 0.4 MG: 0.2 INJECTION, SOLUTION INTRAMUSCULAR; INTRAVENOUS at 13:35

## 2018-09-05 RX ADMIN — Medication 2 G: at 12:16

## 2018-09-05 RX ADMIN — SODIUM CHLORIDE 1000 ML: 900 INJECTION, SOLUTION INTRAVENOUS at 10:42

## 2018-09-05 RX ADMIN — PROPOFOL 150 MG: 10 INJECTION, EMULSION INTRAVENOUS at 12:10

## 2018-09-05 RX ADMIN — ONDANSETRON 4 MG: 2 INJECTION INTRAMUSCULAR; INTRAVENOUS at 13:24

## 2018-09-05 RX ADMIN — FENTANYL CITRATE 100 MCG: 50 INJECTION, SOLUTION INTRAMUSCULAR; INTRAVENOUS at 12:10

## 2018-09-05 RX ADMIN — FENTANYL CITRATE 100 MCG: 50 INJECTION, SOLUTION INTRAMUSCULAR; INTRAVENOUS at 12:40

## 2018-09-05 RX ADMIN — MELOXICAM 15 MG: 15 TABLET ORAL at 10:42

## 2018-09-05 RX ADMIN — SODIUM CHLORIDE: 900 INJECTION, SOLUTION INTRAVENOUS at 13:14

## 2018-09-05 RX ADMIN — ACETAMINOPHEN 975 MG: 325 TABLET ORAL at 10:42

## 2018-09-05 RX ADMIN — ROCURONIUM BROMIDE 35 MG: 10 INJECTION INTRAVENOUS at 12:40

## 2018-09-05 RX ADMIN — Medication 2 MG: at 13:31

## 2018-09-05 RX ADMIN — ROCURONIUM BROMIDE 5 MG: 10 INJECTION INTRAVENOUS at 12:10

## 2018-09-05 RX ADMIN — LIDOCAINE HYDROCHLORIDE 80 MG: 20 INJECTION, SOLUTION INFILTRATION; PERINEURAL at 12:10

## 2018-09-05 RX ADMIN — GLYCOPYRROLATE 0.4 MG: 0.2 INJECTION, SOLUTION INTRAMUSCULAR; INTRAVENOUS at 13:31

## 2018-09-05 RX ADMIN — Medication 2 MG: at 13:35

## 2018-09-05 RX ADMIN — MIDAZOLAM 2 MG: 1 INJECTION INTRAMUSCULAR; INTRAVENOUS at 12:02

## 2018-09-05 NOTE — ANESTHESIA PROCEDURE NOTES
Airway  Urgency: elective    Airway not difficult    General Information and Staff    Patient location during procedure: OR  CRNA: SHABBIR LEONARD    Indications and Patient Condition  Indications for airway management: airway protection    Preoxygenated: yes  MILS maintained throughout  Mask difficulty assessment: 2 - vent by mask + OA or adjuvant +/- NMBA    Final Airway Details  Final airway type: endotracheal airway      Successful airway: ETT  Cuffed: yes   Successful intubation technique: direct laryngoscopy  Facilitating devices/methods: intubating stylet and cricoid pressure  Endotracheal tube insertion site: oral  Blade: Junaid  Blade size: 4  ETT size: 7.5 mm  Cormack-Lehane Classification: grade IIb - view of arytenoids or posterior of glottis only  Placement verified by: chest auscultation and capnometry   Measured from: lips  ETT to lips (cm): 23  Number of attempts at approach: 1

## 2018-09-05 NOTE — ANESTHESIA PROCEDURE NOTES
Peripheral Block    Patient location during procedure: pre-op  Start time: 9/5/2018 10:57 AM  Reason for block: at surgeon's request and post-op pain management  Performed by  Anesthesiologist: GRACIELA LEHMAN  Assisted by: MALIA KIRK  Preanesthetic Checklist  Completed: patient identified, site marked, surgical consent, pre-op evaluation, timeout performed, IV checked, risks and benefits discussed and monitors and equipment checked  Prep:  Pt Position: supine  Sterile barriers:cap, gloves, gown, mask and sterile barriers  Prep: ChloraPrep  Patient monitoring: blood pressure monitoring, continuous pulse oximetry and EKG  Procedure  Sedation:no  Performed under: local infiltration  Guidance:ultrasound guided and landmark technique  Images:still images obtained    Laterality:right  Block Type:interscalene  Injection Technique:single-shot  Needle Type:echogenic  Needle Gauge:20 G  Resistance on Injection: none  Medications  Comment:Dexamethasone 10mg added  Local Injected:ropivacaine 0.5% Local Amount Injected:20mL  Post Assessment  Injection Assessment: negative aspiration for heme, no paresthesia on injection and incremental injection  Patient Tolerance:comfortable throughout block  Complications:no

## 2018-09-05 NOTE — ANESTHESIA POSTPROCEDURE EVALUATION
Patient: Carlos Zapata    Procedure Summary     Date:  09/05/18 Room / Location:  [facility] OR [address] [place] [address] OR    Anesthesia Start:  1206 Anesthesia Stop:  1351    Procedure:  ARTHROSCOPY OF RIGHT SHOULDER WITH SUBACROMIAL DECOMPRESSION /FLORESITA AND EVALUATION OF ROTATOR CUFF. TENOTOMY     -   REGIONAL BLOCK (Right Shoulder) Diagnosis:       Essential hypertension      Impingement syndrome of right shoulder      Acute pain of right shoulder      Type 2 diabetes mellitus without complication, without long-term current use of insulin (CMS/HCC)      (Essential hypertension [I10])      (Impingement syndrome of right shoulder [M75.41])      (Acute pain of right shoulder [M25.511])      (Type 2 diabetes mellitus without complication, without long-term current use of insulin (CMS/HCC) [E11.9])    Surgeon:  Anselmo Hamilton MD Provider:  Mina Calixto MD    Anesthesia Type:  general, regional ASA Status:  3          Anesthesia Type: general, regional  Last vitals  BP   150/72 (09/05/18 1110)   Temp   97.7 °F (36.5 °C) (09/05/18 1057)   Pulse   71 (09/05/18 1110)   Resp   18 (09/05/18 1057)     SpO2   100 % (09/05/18 1110)     Post Anesthesia Care and Evaluation    Patient location during evaluation: bedside  Patient participation: waiting for patient participation  Pain management: adequate  Airway patency: patent  Anesthetic complications: No anesthetic complications    Cardiovascular status: acceptable  Respiratory status: acceptable, oral airway, face mask and airway suctioned  Hydration status: acceptable

## 2018-09-05 NOTE — H&P (VIEW-ONLY)
"Carlos Zapata is a 68 y.o. male returns for     Chief Complaint   Patient presents with   • Right Shoulder - Follow-up, Pain       HISTORY OF PRESENT ILLNESS: right shoulder pain started about 6 months ago no injury, patient was seen by ROHIT Rodriguez steroid injection done on 7/30/2018, helped a little. Physical therapy at [facility].     Having pain on top of shoulder and down the side of arm to mid humerus area.  No specific injury.  No numbness or tingling.  Pain at night, wakes him up.  Mostly dull ache but occasional sharp pains  Pain with activity of daily living.  Has had prior injections and physical therapy.    Patient is unhappy with quality of life.     CONCURRENT MEDICAL HISTORY:    The following portions of the patient's history were reviewed and updated as appropriate: allergies, current medications, past family history, past medical history, past social history, past surgical history and problem list.     ROS  No fevers or chills.  No chest pain or shortness of air.  No GI or  disturbances.  All other systems reviewed as negative except right shoulder pain.    PHYSICAL EXAMINATION:       Ht 189.2 cm (74.5\")   Wt 90.7 kg (200 lb)   BMI 25.33 kg/m²     Physical Exam   Constitutional: He is oriented to person, place, and time. He appears well-developed and well-nourished. No distress.   Cardiovascular: Normal rate, regular rhythm and normal heart sounds.    Pulmonary/Chest: Effort normal and breath sounds normal.   Abdominal: Soft. Bowel sounds are normal.   Neurological: He is alert and oriented to person, place, and time.   Psychiatric: He has a normal mood and affect. His behavior is normal. Judgment and thought content normal.   Vitals reviewed.      GAIT:     [x]  Normal  []  Antalgic    Assistive device: [x]  None  []  Walker     []  Crutches  []  Cane     []  Wheelchair  []  Stretcher    Right Shoulder Exam     Tenderness   The patient is experiencing tenderness in the " acromioclavicular joint and acromion.    Range of Motion   Active Abduction: 80   Forward Flexion: 110     Muscle Strength   Abduction: 4/5   Supraspinatus: 4/5     Tests   Cross Arm: positive  Hawkin's test: positive  Impingement: positive    Other   Erythema: absent  Sensation: normal  Pulse: present      Left Shoulder Exam     Tenderness   The patient is experiencing no tenderness.         Range of Motion   The patient has normal left shoulder ROM.    Muscle Strength   The patient has normal left shoulder strength.    Tests   Hawkin's test: negative  Impingement: negative    Other   Erythema: absent  Sensation: normal  Pulse: present               X-ray shoulder right 2 or more views6/20/2018  [facility]  Result Impression     Negative right shoulder.    [id number]   Result Narrative   Right shoulder, 3 views    Indication: Pain, decreased range of motion.    Indication: No fracture or dislocation is seen in the right shoulder. The glenohumeral and acromioclavicular joints are unremarkable. Included upper right ribs are within normal limits. A small osteophyte is seen at the inferomedial humeral head. Tiny   osteophytes are seen at the AC joint.   Other Result Information   Zach, Rad Results In - 06/20/2018  3:24 PM CDT  Right shoulder, 3 views    Indication: Pain, decreased range of motion.    Indication: No fracture or dislocation is seen in the right shoulder. The glenohumeral and acromioclavicular joints are unremarkable. Included upper right ribs are within normal limits. A small osteophyte is seen at the inferomedial humeral head. Tiny   osteophytes are seen at the AC joint.    IMPRESSION:     Negative right shoulder.     Mri right shoulder @ [facility] 7/23/2018  Impression  Labral tear     --review of MRI   -- significant AC arthritis with impingement.  Supraspinatus tendonopathy.  Superior labral fraying with partial tear of biceps.      Last Hemoglobin A1C was 6.6 on  6/20/2018      ASSESSMENT:    Diagnoses and all orders for this visit:    Impingement syndrome of right shoulder  -     Case Request; Standing  -     ceFAZolin (ANCEF) 2 g in sodium chloride 0.9 % 100 mL IVPB; Infuse 2 g into a venous catheter 1 (One) Time.  -     acetaminophen (TYLENOL) tablet 975 mg; Take 3 tablets by mouth 1 (One) Time.  -     meloxicam (MOBIC) tablet 15 mg; Take 2 tablets by mouth 1 (One) Time.  -     Case Request    Acute pain of right shoulder  -     Case Request; Standing  -     ceFAZolin (ANCEF) 2 g in sodium chloride 0.9 % 100 mL IVPB; Infuse 2 g into a venous catheter 1 (One) Time.  -     acetaminophen (TYLENOL) tablet 975 mg; Take 3 tablets by mouth 1 (One) Time.  -     meloxicam (MOBIC) tablet 15 mg; Take 2 tablets by mouth 1 (One) Time.  -     Case Request    Essential hypertension  -     Case Request; Standing  -     ceFAZolin (ANCEF) 2 g in sodium chloride 0.9 % 100 mL IVPB; Infuse 2 g into a venous catheter 1 (One) Time.  -     acetaminophen (TYLENOL) tablet 975 mg; Take 3 tablets by mouth 1 (One) Time.  -     meloxicam (MOBIC) tablet 15 mg; Take 2 tablets by mouth 1 (One) Time.  -     Case Request    Type 2 diabetes mellitus without complication, without long-term current use of insulin (CMS/[facility])  -     Case Request; Standing  -     ceFAZolin (ANCEF) 2 g in sodium chloride 0.9 % 100 mL IVPB; Infuse 2 g into a venous catheter 1 (One) Time.  -     acetaminophen (TYLENOL) tablet 975 mg; Take 3 tablets by mouth 1 (One) Time.  -     meloxicam (MOBIC) tablet 15 mg; Take 2 tablets by mouth 1 (One) Time.  -     Case Request    Other orders  -     ibuprofen (ADVIL,MOTRIN) 800 MG tablet; Take 1 tablet by mouth 3 (Three) Times a Day.  -     Follow Anesthesia Guidelines / Standing Orders; Future  -     Obtain informed consent  -     Follow Anesthesia Guidelines / Standing Orders; Standing  -     NPO After Midnight  -     Nerve Block; Standing          PLAN    Plan arthroscopy of right shoulder  with SAD/FLORESITA and eval of rotator cuff, possible biceps tenotomy.    The patient voiced understanding of the risks, benefits, and alternative forms of treatment that were discussed and the patient consents to proceed with surgery.  All risks, benefits and alternatives were discussed.  Risks including to but not exclusive to anesthetic complications, including death, MI, CVA, infection, bleeding DVT, fracture, residual pain and need for future surgery.  This discussion was held with the patient by Anselmo Hamilton MD and all questions were answered.    His last A1C was less than 8.        Patient's Body mass index is 25.33 kg/m². BMI is within normal parameters. No follow-up required.      Return for Post-operative eval.    Anselmo Hamilton MD

## 2018-09-05 NOTE — DISCHARGE INSTR - APPOINTMENTS
PHYSICAL THERAPY APPOINTMENT IS Friday September 7,2018 AT 10AM AT [facility]

## 2018-09-05 NOTE — ANESTHESIA PREPROCEDURE EVALUATION
Anesthesia Evaluation     no history of anesthetic complications:  NPO Solid Status: > 8 hours  NPO Liquid Status: > 2 hours           Airway   Mallampati: II  TM distance: >3 FB  Neck ROM: full  Possible difficult intubation  Dental    (+) poor dentition        Pulmonary - negative pulmonary ROS and normal exam    breath sounds clear to auscultation  (-) not a smoker  Cardiovascular - normal exam  Exercise tolerance: good (4-7 METS)    ECG reviewed  PT is on anticoagulation therapy  Patient on routine beta blocker and Beta blocker given within 24 hours of surgery  Rhythm: regular  Rate: normal    (+) hypertension poorly controlled 2 medications or greater, CAD, cardiac stents more than 12 months ago hyperlipidemia,   (-) angina, murmur    ROS comment: Sinus bradycardia  Otherwise normal ECG  No previous ECGs available  Confirmed by AKRAM    Cardiac clearance from [facility]    Neuro/Psych- negative ROS  GI/Hepatic/Renal/Endo    (+)   diabetes mellitus (glu 143) type 2 well controlled,     Musculoskeletal     (+) arthralgias,   Abdominal    Substance History - negative use     OB/GYN          Other   (+) arthritis     (-) history of cancer                  Anesthesia Plan    ASA 3     general and regional   (Interscalene discussed and patient agrees to proceed)  intravenous induction   Anesthetic plan, all risks, benefits, and alternatives have been provided, discussed and informed consent has been obtained with: patient and spouse/significant other.

## 2018-09-05 NOTE — OP NOTE
NAME: Carlos Zapata     YOB: 1950    DATE OF SURGERY: 9/5/2018    PREOPERATIVE DIAGNOSIS:  Essential hypertension [I10]  Impingement syndrome of right shoulder [M75.41]  Acute pain of right shoulder [M25.511]  Type 2 diabetes mellitus without complication, without long-term current use of insulin (CMS/HCC) [E11.9]    POSTOPERATIVE DIAGNOSIS:  Post-Op Diagnosis Codes:     * Essential hypertension [I10]     * Impingement syndrome of right shoulder [M75.41]     * Acute pain of right shoulder [M25.511]     * Type 2 diabetes mellitus without complication, without long-term current use of insulin (CMS/HCC) [E11.9]    PROCEDURE PERFORMED:      Arthroscopy of the Right shoulder with   1.  FLORESITA procedure   2.  Sub-acromial decompression    SURGEON:  Anselmo Hamilton MD    Assistant:  BAYRON Do    Staff:    Circulator: Nara Winter RN  Scrub Person: Audra Wilson; Ruchi Smith CST; Rosalinda Miller  Assistant: Berkley Lima CSA    Anesthesia:  General with Block      Estimated Blood Loss:  minimal    Specimens : None    Complications: none    Implants:  Nothing was implanted during the procedure    DESCRIPTION OF PROCEDURE:   Once consent was obtained, the patient was taken to the operating. Once adequate anesthesia was obtained, then the patient was rolled in the lateral decubitus position, Right side up. All bony prominences were well padded, and an axillary roll was placed. The Right upper extremity was then manipulated through a full range of  motion without any difficulty. The Right upper extremity was then  prepped and draped in the standard surgical fashion. The Right arm was  then placed in the arthroscopic arm pak. The standard posterior  portal was made, and the diagnostic portion of the arthroscopy began.  The intra-articular portion of the exam showed that the articular  surface was in good condition without any sign of articular cartilage wear..  The biceps tendon showed a firm attachment onto the glenoid labrum. The rotator cuff was visualized and found to be intact.   There were no loose bodies in the intra-articular space or in the subacromial recess.      The subacromial space was then entered and there was noted to be a large  subacromial spur on the anterior aspect. There also was noted to be  significant inflammatory bursitis, which was resected using electrocautery and the  suction shaver. Once the acromion and the clavicle were clearly  identified, then the jose m was used to resect the subacromial spur and to  turn the acromion into a type 1 acromion. This was done after having  established a lateral portal using an 18-gauge spinal needle for  localization and a small stab wound incision.      At this point, the anterior portal was also established using an 18-  gauge spinal needle for localization and a small stab wound incision.  The cannula was inserted and the distal clavicle resection was performed  in the standard fashion resecting approximately 8-10 mm of bone.      The attention was returned to the rotator cuff which was found to be intact.  There is some very mild bursal sided fraying but no definitive partial thickness tear    The shaver was then allowed to run on suction to remove any  remaining loose fragments. The arthroscopy was then terminated. The  wounds were closed with interrupted nylon suture, covered with Xeroform  gauze, 4x4's, and Elastoplast dressing. The patient was then awakened  and taken to the recovery room in good condition. He tolerated the  procedure very well.      Anselmo Hamilton MD     Date: 9/5/2018  Time: 2:07 PM

## 2018-09-18 ENCOUNTER — OFFICE VISIT (OUTPATIENT)
Dept: ORTHOPEDIC SURGERY | Facility: CLINIC | Age: 68
End: 2018-09-18

## 2018-09-18 DIAGNOSIS — M25.511 ACUTE PAIN OF RIGHT SHOULDER: ICD-10-CM

## 2018-09-18 DIAGNOSIS — M75.41 IMPINGEMENT SYNDROME OF RIGHT SHOULDER: Primary | ICD-10-CM

## 2018-09-18 PROCEDURE — 99024 POSTOP FOLLOW-UP VISIT: CPT | Performed by: NURSE PRACTITIONER

## 2018-09-18 NOTE — PROGRESS NOTES
Carlos Zapata is a 68 y.o. male is s/p       Chief Complaint   Patient presents with   • Right Shoulder - Post-op   • Suture / Staple Removal       HISTORY OF PRESENT ILLNESS: ARTHROSCOPY OF RIGHT SHOULDER WITH SUBACROMIAL DECOMPRESSION /FLORESITA AND EVALUATION OF ROTATOR CUFF. TENOTOMY done on 9/5/2018. Physical therapy at [facility] 3 days/week. Pain score 2/10 today.        No Known Allergies      Current Outpatient Prescriptions:   •  acetaminophen (TYLENOL) 500 MG tablet, Take 500 mg by mouth Every 6 (Six) Hours As Needed for Mild Pain ., Disp: , Rfl:   •  aspirin 325 MG tablet, Take 325 mg by mouth Every Night. Last dose 8-28-18 stopped for surgery, Disp: , Rfl:   •  Canagliflozin (INVOKANA) 100 MG tablet, Take 100 mg by mouth Daily., Disp: , Rfl:   •  ezetimibe (ZETIA) 10 MG tablet, Take 10 mg by mouth Daily., Disp: , Rfl:   •  glimepiride (AMARYL) 4 MG tablet, Take 4 mg by mouth Every Night., Disp: , Rfl:   •  hydrochlorothiazide (HYDRODIURIL) 12.5 MG tablet, Take 12.5 mg by mouth Daily., Disp: , Rfl:   •  HYDROcodone-acetaminophen (NORCO) 7.5-325 MG per tablet, Take 1 tablet by mouth Every 4 (Four) Hours As Needed for Moderate Pain  (Pain)., Disp: 40 tablet, Rfl: 0  •  lisinopril (PRINIVIL,ZESTRIL) 10 MG tablet, Take 10 mg by mouth Daily., Disp: , Rfl:   •  loratadine (CLARITIN) 10 MG tablet, Take 10 mg by mouth As Needed., Disp: , Rfl:   •  metFORMIN (GLUCOPHAGE) 1000 MG tablet, Take 1,000 mg by mouth 2 (Two) Times a Day With Meals., Disp: , Rfl:   •  nitroglycerin (NITROSTAT) 0.4 MG SL tablet, Place 0.4 mg under the tongue Every 5 (Five) Minutes As Needed for Chest Pain. Take no more than 3 doses in 15 minutes., Disp: , Rfl:   •  simvastatin (ZOCOR) 80 MG tablet, Take 80 mg by mouth Every Night., Disp: , Rfl:   •  sotalol (BETAPACE) 80 MG tablet, Take 80 mg by mouth 2 (Two) Times a Day., Disp: , Rfl:   •  tadalafil (CIALIS) 20 MG tablet, Take 20 mg by mouth Daily As Needed for erectile  dysfunction., Disp: , Rfl:     No fevers or chills.  No nausea or vomiting.      PHYSICAL EXAMINATION:       Carlos Zapata is a 68 y.o. male    Patient is awake and alert, answers questions appropriately and is in no apparent distress.    GAIT:     [x]  Normal  []  Antalgic    Assistive device: [x]  None  []  Walker     []  Crutches  []  Cane     []  Wheelchair  []  Stretcher    Right Shoulder Exam     Tenderness   The patient is experiencing tenderness in the acromioclavicular joint.    Other   Erythema: absent  Sensation: normal  Pulse: present    Comments:  Sutures removed edges of the incision are approximated without any evidence of infection.      Left Shoulder Exam   Left shoulder exam is normal.              No results found.        ASSESSMENT:    Diagnoses and all orders for this visit:    Impingement syndrome of right shoulder    Acute pain of right shoulder          PLAN  Recommended continue progression range of motion and activity as tolerated based on pain and follow-up in 4 to weeks with Dr. Hamilton.  No Follow-up on file.    LYNNETTE Vance

## 2018-10-09 ENCOUNTER — OFFICE VISIT (OUTPATIENT)
Dept: ORTHOPEDIC SURGERY | Facility: CLINIC | Age: 68
End: 2018-10-09

## 2018-10-09 VITALS — BODY MASS INDEX: 25.24 KG/M2 | WEIGHT: 203 LBS | HEIGHT: 75 IN

## 2018-10-09 DIAGNOSIS — M75.41 IMPINGEMENT SYNDROME OF RIGHT SHOULDER: ICD-10-CM

## 2018-10-09 DIAGNOSIS — M25.511 ACUTE PAIN OF RIGHT SHOULDER: Primary | ICD-10-CM

## 2018-10-09 DIAGNOSIS — M75.01 ADHESIVE CAPSULITIS OF RIGHT SHOULDER: ICD-10-CM

## 2018-10-09 PROCEDURE — 20610 DRAIN/INJ JOINT/BURSA W/O US: CPT | Performed by: ORTHOPAEDIC SURGERY

## 2018-10-09 PROCEDURE — 99024 POSTOP FOLLOW-UP VISIT: CPT | Performed by: ORTHOPAEDIC SURGERY

## 2018-10-09 RX ORDER — OXYCODONE AND ACETAMINOPHEN 7.5; 325 MG/1; MG/1
1 TABLET ORAL NIGHTLY PRN
Qty: 30 TABLET | Refills: 0 | Status: ON HOLD | OUTPATIENT
Start: 2018-10-09 | End: 2018-11-05

## 2018-10-09 RX ORDER — MELOXICAM 15 MG/1
15 TABLET ORAL DAILY
Qty: 30 TABLET | Refills: 2 | Status: SHIPPED | OUTPATIENT
Start: 2018-10-09 | End: 2018-11-29 | Stop reason: SDUPTHER

## 2018-10-09 RX ADMIN — TRIAMCINOLONE ACETONIDE 40 MG: 40 INJECTION, SUSPENSION INTRA-ARTICULAR; INTRAMUSCULAR at 15:23

## 2018-10-09 RX ADMIN — LIDOCAINE HYDROCHLORIDE 2 ML: 20 INJECTION, SOLUTION INFILTRATION; PERINEURAL at 15:23

## 2018-10-09 NOTE — PROGRESS NOTES
Carlos Zapata is a 68 y.o. male is s/p       Chief Complaint   Patient presents with   • Right Shoulder - Post-op     9/5/18  Surgeon Role   Anselmo Hamilton MD Primary    Procedure Laterality Anesthesia   ARTHROSCOPY OF RIGHT SHOULDER WITH SUBACROMIAL DECOMPRESSION /FLORESITA AND EVALUATION OF ROTATOR CUFF. TENOTOMY     -   REGIONAL BLOCK Right General with Block          HISTORY OF PRESENT ILLNESS: Patient being seen for right shoulder post-op.Patient complains of shoulder locking up. Patient has been doing physical therapy at [facility] 3 days/week. Patient has been doing ice/heat therapy and HEP.   Stiffness is seem to increased recently.  He's having a difficult time with range of motion.  He is worried his shoulder is getting too stiff.       No Known Allergies      Current Outpatient Prescriptions:   •  acetaminophen (TYLENOL) 500 MG tablet, Take 500 mg by mouth Every 6 (Six) Hours As Needed for Mild Pain ., Disp: , Rfl:   •  aspirin 325 MG tablet, Take 325 mg by mouth Every Night. Last dose 8-28-18 stopped for surgery, Disp: , Rfl:   •  Canagliflozin (INVOKANA) 100 MG tablet, Take 100 mg by mouth Daily., Disp: , Rfl:   •  ezetimibe (ZETIA) 10 MG tablet, Take 10 mg by mouth Daily., Disp: , Rfl:   •  glimepiride (AMARYL) 4 MG tablet, Take 4 mg by mouth Every Night., Disp: , Rfl:   •  hydrochlorothiazide (HYDRODIURIL) 12.5 MG tablet, Take 12.5 mg by mouth Daily., Disp: , Rfl:   •  lisinopril (PRINIVIL,ZESTRIL) 10 MG tablet, Take 10 mg by mouth Daily., Disp: , Rfl:   •  loratadine (CLARITIN) 10 MG tablet, Take 10 mg by mouth As Needed., Disp: , Rfl:   •  metFORMIN (GLUCOPHAGE) 1000 MG tablet, Take 1,000 mg by mouth 2 (Two) Times a Day With Meals., Disp: , Rfl:   •  nitroglycerin (NITROSTAT) 0.4 MG SL tablet, Place 0.4 mg under the tongue Every 5 (Five) Minutes As Needed for Chest Pain. Take no more than 3 doses in 15 minutes., Disp: , Rfl:   •  simvastatin (ZOCOR) 80 MG tablet, Take 80  mg by mouth Every Night., Disp: , Rfl:   •  sotalol (BETAPACE) 80 MG tablet, Take 80 mg by mouth 2 (Two) Times a Day., Disp: , Rfl:   •  tadalafil (CIALIS) 20 MG tablet, Take 20 mg by mouth Daily As Needed for erectile dysfunction., Disp: , Rfl:   •  meloxicam (MOBIC) 15 MG tablet, Take 1 tablet by mouth Daily for 30 days., Disp: 30 tablet, Rfl: 2  •  oxyCODONE-acetaminophen (PERCOCET) 7.5-325 MG per tablet, Take 1 tablet by mouth At Night As Needed for Moderate Pain ., Disp: 30 tablet, Rfl: 0    No fevers or chills.  No nausea or vomiting.      PHYSICAL EXAMINATION:       Carlos Zapata is a 68 y.o. male    Patient is awake and alert, answers questions appropriately and is in no apparent distress.    GAIT:     [x]  Normal  []  Antalgic    Assistive device: [x]  None  []  Walker     []  Crutches  []  Cane     []  Wheelchair  []  Stretcher    Right Shoulder Exam     Tenderness   The patient is experiencing no tenderness.        Range of Motion   Active Abduction: 70   Forward Flexion: 90   External Rotation: 40   Internal Rotation 90 degrees: 30     Muscle Strength   Abduction: 4/5   Supraspinatus: 4/5     Other   Erythema: absent  Sensation: normal  Pulse: present              MRI shoulder right without contrast      Large Joint Arthrocentesis  Date/Time: 10/9/2018 3:23 PM  Consent given by: patient  Site marked: site marked  Timeout: Immediately prior to procedure a time out was called to verify the correct patient, procedure, equipment, support staff and site/side marked as required   Supporting Documentation  Indications: pain   Procedure Details  Location: shoulder - R glenohumeral  Preparation: Patient was prepped and draped in the usual sterile fashion  Needle size: 22 G  Approach: posterior  Medications administered: 40 mg triamcinolone acetonide 40 MG/ML; 2 mL lidocaine 2%  Patient tolerance: patient tolerated the procedure well with no immediate complications            ASSESSMENT:    Diagnoses and  all orders for this visit:    Acute pain of right shoulder  -     Large Joint Arthrocentesis    Impingement syndrome of right shoulder  -     Large Joint Arthrocentesis    Adhesive capsulitis of right shoulder    Other orders  -     oxyCODONE-acetaminophen (PERCOCET) 7.5-325 MG per tablet; Take 1 tablet by mouth At Night As Needed for Moderate Pain .  -     meloxicam (MOBIC) 15 MG tablet; Take 1 tablet by mouth Daily for 30 days.          PLAN    Discussed steroid injection into the right shoulder today.  We also discussed continued home exercise program with strengthening and conditioning and stretching of the shoulder.  We discussed his capsulitis and relation to diabetes.  Recheck in 4 weeks and we discussed the possibility of a manipulation under anesthesia.    Patient's Body mass index is 25.71 kg/m². BMI is within normal parameters. No follow-up required.      Return in about 4 weeks (around 11/6/2018).    Anselmo Hamilton MD

## 2018-10-10 RX ORDER — LIDOCAINE HYDROCHLORIDE 20 MG/ML
2 INJECTION, SOLUTION INFILTRATION; PERINEURAL
Status: COMPLETED | OUTPATIENT
Start: 2018-10-09 | End: 2018-10-09

## 2018-10-10 RX ORDER — TRIAMCINOLONE ACETONIDE 40 MG/ML
40 INJECTION, SUSPENSION INTRA-ARTICULAR; INTRAMUSCULAR
Status: COMPLETED | OUTPATIENT
Start: 2018-10-09 | End: 2018-10-09

## 2018-10-24 ENCOUNTER — OFFICE VISIT (OUTPATIENT)
Dept: ORTHOPEDIC SURGERY | Facility: CLINIC | Age: 68
End: 2018-10-24

## 2018-10-24 VITALS — BODY MASS INDEX: 25.12 KG/M2 | HEIGHT: 75 IN | WEIGHT: 202 LBS

## 2018-10-24 DIAGNOSIS — M75.01 ADHESIVE CAPSULITIS OF RIGHT SHOULDER: Primary | ICD-10-CM

## 2018-10-24 DIAGNOSIS — M75.41 IMPINGEMENT SYNDROME OF RIGHT SHOULDER: ICD-10-CM

## 2018-10-24 DIAGNOSIS — E11.9 TYPE 2 DIABETES MELLITUS WITHOUT COMPLICATION, WITHOUT LONG-TERM CURRENT USE OF INSULIN (HCC): ICD-10-CM

## 2018-10-24 DIAGNOSIS — M25.511 ACUTE PAIN OF RIGHT SHOULDER: ICD-10-CM

## 2018-10-24 PROCEDURE — 99024 POSTOP FOLLOW-UP VISIT: CPT | Performed by: ORTHOPAEDIC SURGERY

## 2018-10-24 NOTE — PROGRESS NOTES
Carlos Zapata is a 68 y.o. male is s/p       Chief Complaint   Patient presents with   • Right Shoulder - Follow-up       HISTORY OF PRESENT ILLNESS: f/u right shoulder, surgery done on 9/15/2018   physical therapy at [facility]. Patient continues to have pain in shoulder he states that he doesn't have to be doing any activity for pain to start.   Having pain  Not improving with PT.  Stiff and stiff and not getting better.  Steroid and NSAIDS has not helped pain or motion    Past Medical History:   Diagnosis Date   • Anesthesia     difficult to wake when was child with tonsil surgery   • Arthritis    • Diabetes mellitus (CMS/HCC)    • Hx of heart artery stent 2000   • Hypertension    • Shoulder pain, right    • Wears glasses        No Known Allergies    Current Outpatient Prescriptions on File Prior to Visit   Medication Sig   • acetaminophen (TYLENOL) 500 MG tablet Take 500 mg by mouth Every 6 (Six) Hours As Needed for Mild Pain .   • aspirin 325 MG tablet Take 325 mg by mouth Every Night. Last dose 8-28-18 stopped for surgery   • Canagliflozin (INVOKANA) 100 MG tablet Take 100 mg by mouth Daily.   • ezetimibe (ZETIA) 10 MG tablet Take 10 mg by mouth Daily.   • glimepiride (AMARYL) 4 MG tablet Take 4 mg by mouth Every Night.   • hydrochlorothiazide (HYDRODIURIL) 12.5 MG tablet Take 12.5 mg by mouth Daily.   • lisinopril (PRINIVIL,ZESTRIL) 10 MG tablet Take 10 mg by mouth Daily.   • loratadine (CLARITIN) 10 MG tablet Take 10 mg by mouth As Needed.   • meloxicam (MOBIC) 15 MG tablet Take 1 tablet by mouth Daily for 30 days.   • metFORMIN (GLUCOPHAGE) 1000 MG tablet Take 1,000 mg by mouth 2 (Two) Times a Day With Meals.   • nitroglycerin (NITROSTAT) 0.4 MG SL tablet Place 0.4 mg under the tongue Every 5 (Five) Minutes As Needed for Chest Pain. Take no more than 3 doses in 15 minutes.   • oxyCODONE-acetaminophen (PERCOCET) 7.5-325 MG per tablet Take 1 tablet by mouth At Night As Needed for Moderate Pain .   •  simvastatin (ZOCOR) 80 MG tablet Take 80 mg by mouth Every Night.   • sotalol (BETAPACE) 80 MG tablet Take 80 mg by mouth 2 (Two) Times a Day.   • tadalafil (CIALIS) 20 MG tablet Take 20 mg by mouth Daily As Needed for erectile dysfunction.     No current facility-administered medications on file prior to visit.        Past Surgical History:   Procedure Laterality Date   • KNEE SURGERY Right 2015?   • SHOULDER ARTHROSCOPY Right 9/5/2018    Procedure: ARTHROSCOPY OF RIGHT SHOULDER WITH SUBACROMIAL DECOMPRESSION /FLORESITA AND EVALUATION OF ROTATOR CUFF. TENOTOMY     -   REGIONAL BLOCK;  Surgeon: Anselmo Hamilton MD;  Location: [facility];  Service: Orthopedics   • SHOULDER SURGERY Left 2015?   • TONSILLECTOMY  1958       Family History   Problem Relation Age of Onset   • Hypertension Other    • Diabetes Other        Social History     Social History   • Marital status:      Spouse name: N/A   • Number of children: N/A   • Years of education: N/A     Occupational History   • Not on file.     Social History Main Topics   • Smoking status: Never Smoker   • Smokeless tobacco: Never Used   • Alcohol use No   • Drug use: No   • Sexual activity: Not on file     Other Topics Concern   • Not on file     Social History Narrative   • No narrative on file               No fevers or chills.  No nausea or vomiting.      PHYSICAL EXAMINATION:       Carlos Zapata is a 68 y.o. male    Patient is awake and alert, answers questions appropriately and is in no apparent distress.  Heart:  Regular rate  Lungs: clear to auscultation  Abdoment:  Soft, nontender, nondistended.    GAIT:     [x]  Normal  []  Antalgic    Assistive device: [x]  None  []  Walker     []  Crutches  []  Cane     []  Wheelchair  []  Stretcher    Left Shoulder Exam     Range of Motion   Active Abduction: 90   Forward Flexion: 90     Muscle Strength   Abduction: 4/5   Supraspinatus: 4/5     Tests   Hawkin's test: negative    Other   Erythema:  absent  Sensation: normal  Pulse: present                     ASSESSMENT:    Diagnoses and all orders for this visit:    Adhesive capsulitis of right shoulder  -     Case Request; Standing  -     Case Request    Acute pain of right shoulder  -     Case Request; Standing  -     Case Request    Impingement syndrome of right shoulder  -     Case Request; Standing  -     Case Request    Type 2 diabetes mellitus without complication, without long-term current use of insulin (CMS/[facility])    Other orders  -     Follow Anesthesia Guidelines / Standing Orders; Future  -     Follow Anesthesia Guidelines / Standing Orders; Standing  -     Verify NPO Status; Standing  -     Obtain informed consent (if not collected inpatient or PAT); Standing          PLAN    Still with stiffness and significant limitation with motion  Pain is better that before surgery  Still with limitations and not improving with PT    The patient voiced understanding of the risks, benefits, and alternative forms of treatment that were discussed and the patient consents to proceed with surgery.  All risks, benefits and alternatives were discussed.  Risks including to but not exclusive to anesthetic complications, including death, MI, CVA, infection, bleeding DVT, fracture, residual pain and need for future surgery.  This discussion was held with the patient by Anselmo Hamilton MD and all questions were answered.    Discussed shoulder manipulation left.    Patient's Body mass index is 25.59 kg/m². BMI is above normal parameters. Recommendations include: exercise counseling and nutrition counseling.    Return for Post-operative eval.    Anselmo Hamilton MD

## 2018-11-01 PROBLEM — M75.01 ADHESIVE CAPSULITIS OF RIGHT SHOULDER: Status: ACTIVE | Noted: 2018-11-01

## 2018-11-04 ENCOUNTER — ANESTHESIA EVENT (OUTPATIENT)
Dept: PERIOP | Facility: HOSPITAL | Age: 68
End: 2018-11-04

## 2018-11-05 ENCOUNTER — ANESTHESIA (OUTPATIENT)
Dept: PERIOP | Facility: HOSPITAL | Age: 68
End: 2018-11-05

## 2018-11-05 ENCOUNTER — HOSPITAL ENCOUNTER (OUTPATIENT)
Facility: HOSPITAL | Age: 68
Setting detail: HOSPITAL OUTPATIENT SURGERY
Discharge: HOME | End: 2018-11-05
Attending: ORTHOPAEDIC SURGERY | Admitting: ORTHOPAEDIC SURGERY

## 2018-11-05 VITALS
DIASTOLIC BLOOD PRESSURE: 90 MMHG | HEIGHT: 74 IN | SYSTOLIC BLOOD PRESSURE: 170 MMHG | RESPIRATION RATE: 18 BRPM | HEART RATE: 70 BPM | TEMPERATURE: 97.2 F | OXYGEN SATURATION: 100 % | BODY MASS INDEX: 25.49 KG/M2 | WEIGHT: 198.63 LBS

## 2018-11-05 DIAGNOSIS — M75.41 IMPINGEMENT SYNDROME OF RIGHT SHOULDER: ICD-10-CM

## 2018-11-05 DIAGNOSIS — M75.01 ADHESIVE CAPSULITIS OF RIGHT SHOULDER: Primary | ICD-10-CM

## 2018-11-05 DIAGNOSIS — M25.511 ACUTE PAIN OF RIGHT SHOULDER: ICD-10-CM

## 2018-11-05 LAB
ANION GAP SERPL CALCULATED.3IONS-SCNC: 9 MMOL/L (ref 5–15)
BUN BLD-MCNC: 25 MG/DL (ref 7–21)
BUN/CREAT SERPL: 24 (ref 7–25)
CALCIUM SPEC-SCNC: 9.6 MG/DL (ref 8.4–10.2)
CHLORIDE SERPL-SCNC: 101 MMOL/L (ref 95–110)
CO2 SERPL-SCNC: 27 MMOL/L (ref 22–31)
CREAT BLD-MCNC: 1.04 MG/DL (ref 0.7–1.3)
GFR SERPL CREATININE-BSD FRML MDRD: 71 ML/MIN/1.73 (ref 49–113)
GLUCOSE BLD-MCNC: 151 MG/DL (ref 60–100)
GLUCOSE BLDC GLUCOMTR-MCNC: 145 MG/DL (ref 70–130)
POTASSIUM BLD-SCNC: 4.7 MMOL/L (ref 3.5–5.1)
SODIUM BLD-SCNC: 137 MMOL/L (ref 137–145)

## 2018-11-05 PROCEDURE — 23700 MNPJ ANES SHO JT FIXJ APRATS: CPT | Performed by: ORTHOPAEDIC SURGERY

## 2018-11-05 PROCEDURE — 25010000002 FENTANYL CITRATE (PF) 100 MCG/2ML SOLUTION: Performed by: NURSE ANESTHETIST, CERTIFIED REGISTERED

## 2018-11-05 PROCEDURE — 25010000002 PROPOFOL 10 MG/ML EMULSION: Performed by: NURSE ANESTHETIST, CERTIFIED REGISTERED

## 2018-11-05 PROCEDURE — 82962 GLUCOSE BLOOD TEST: CPT

## 2018-11-05 PROCEDURE — 80048 BASIC METABOLIC PNL TOTAL CA: CPT | Performed by: ANESTHESIOLOGY

## 2018-11-05 PROCEDURE — 25010000002 MIDAZOLAM PER 1 MG: Performed by: NURSE ANESTHETIST, CERTIFIED REGISTERED

## 2018-11-05 RX ORDER — PROPOFOL 10 MG/ML
VIAL (ML) INTRAVENOUS AS NEEDED
Status: DISCONTINUED | OUTPATIENT
Start: 2018-11-05 | End: 2018-11-05 | Stop reason: SURG

## 2018-11-05 RX ORDER — OXYCODONE AND ACETAMINOPHEN 7.5; 325 MG/1; MG/1
1 TABLET ORAL EVERY 6 HOURS PRN
Qty: 40 TABLET | Refills: 0 | Status: SHIPPED | OUTPATIENT
Start: 2018-11-05 | End: 2019-01-23

## 2018-11-05 RX ORDER — FENTANYL CITRATE 50 UG/ML
INJECTION, SOLUTION INTRAMUSCULAR; INTRAVENOUS AS NEEDED
Status: DISCONTINUED | OUTPATIENT
Start: 2018-11-05 | End: 2018-11-05 | Stop reason: SURG

## 2018-11-05 RX ORDER — OXYCODONE AND ACETAMINOPHEN 7.5; 325 MG/1; MG/1
1 TABLET ORAL ONCE AS NEEDED
Status: DISCONTINUED | OUTPATIENT
Start: 2018-11-05 | End: 2018-11-05 | Stop reason: HOSPADM

## 2018-11-05 RX ORDER — HYDROCODONE BITARTRATE AND ACETAMINOPHEN 7.5; 325 MG/1; MG/1
1 TABLET ORAL EVERY 4 HOURS PRN
Qty: 40 TABLET | Refills: 0 | Status: SHIPPED | OUTPATIENT
Start: 2018-11-05 | End: 2018-11-05 | Stop reason: HOSPADM

## 2018-11-05 RX ORDER — ONDANSETRON 2 MG/ML
4 INJECTION INTRAMUSCULAR; INTRAVENOUS ONCE AS NEEDED
Status: DISCONTINUED | OUTPATIENT
Start: 2018-11-05 | End: 2018-11-05 | Stop reason: HOSPADM

## 2018-11-05 RX ORDER — SODIUM CHLORIDE 9 MG/ML
1000 INJECTION, SOLUTION INTRAVENOUS CONTINUOUS
Status: DISCONTINUED | OUTPATIENT
Start: 2018-11-05 | End: 2018-11-05 | Stop reason: HOSPADM

## 2018-11-05 RX ORDER — MIDAZOLAM HYDROCHLORIDE 1 MG/ML
INJECTION INTRAMUSCULAR; INTRAVENOUS AS NEEDED
Status: DISCONTINUED | OUTPATIENT
Start: 2018-11-05 | End: 2018-11-05 | Stop reason: SURG

## 2018-11-05 RX ORDER — PROMETHAZINE HYDROCHLORIDE 25 MG/ML
12.5 INJECTION, SOLUTION INTRAMUSCULAR; INTRAVENOUS ONCE AS NEEDED
Status: DISCONTINUED | OUTPATIENT
Start: 2018-11-05 | End: 2018-11-05 | Stop reason: HOSPADM

## 2018-11-05 RX ADMIN — SODIUM CHLORIDE 1000 ML: 9 INJECTION, SOLUTION INTRAVENOUS at 06:09

## 2018-11-05 RX ADMIN — FENTANYL CITRATE 25 MCG: 50 INJECTION, SOLUTION INTRAMUSCULAR; INTRAVENOUS at 07:12

## 2018-11-05 RX ADMIN — PROPOFOL 20 MG: 10 INJECTION, EMULSION INTRAVENOUS at 07:13

## 2018-11-05 RX ADMIN — FENTANYL CITRATE 50 MCG: 50 INJECTION, SOLUTION INTRAMUSCULAR; INTRAVENOUS at 07:04

## 2018-11-05 RX ADMIN — MIDAZOLAM HYDROCHLORIDE 2 MG: 2 INJECTION, SOLUTION INTRAMUSCULAR; INTRAVENOUS at 07:00

## 2018-11-05 RX ADMIN — OXYCODONE HYDROCHLORIDE AND ACETAMINOPHEN 1 TABLET: 7.5; 325 TABLET ORAL at 07:46

## 2018-11-05 RX ADMIN — PROPOFOL 40 MG: 10 INJECTION, EMULSION INTRAVENOUS at 07:10

## 2018-11-05 RX ADMIN — PROPOFOL 120 MG: 10 INJECTION, EMULSION INTRAVENOUS at 07:07

## 2018-11-05 NOTE — ANESTHESIA PREPROCEDURE EVALUATION
Anesthesia Evaluation     no history of anesthetic complications:  NPO Solid Status: > 8 hours  NPO Liquid Status: > 2 hours           Airway   Mallampati: II  TM distance: >3 FB  Neck ROM: full  Possible difficult intubation  Dental    (+) poor dentition        Pulmonary - negative pulmonary ROS and normal exam    breath sounds clear to auscultation  (-) sleep apnea, not a smoker  Cardiovascular - normal exam  Exercise tolerance: good (4-7 METS)    ECG reviewed  PT is on anticoagulation therapy  Patient on routine beta blocker and Beta blocker given within 24 hours of surgery  Rhythm: regular  Rate: normal    (+) hypertension poorly controlled 2 medications or greater, CAD, cardiac stents more than 12 months ago hyperlipidemia,   (-) angina, murmur    ROS comment: Sinus bradycardia  Otherwise normal ECG  No previous ECGs available  Confirmed by AKJODI    Cardiac clearance from [facility]    Neuro/Psych- negative ROS  (-) seizures, headaches, psychiatric history  GI/Hepatic/Renal/Endo    (+)   diabetes mellitus (glu 143) type 2 well controlled,     Musculoskeletal     (+) arthralgias,   Abdominal    Substance History - negative use     OB/GYN          Other   (+) arthritis     (-) history of cancer                    Anesthesia Plan    ASA 3     general     intravenous induction   Anesthetic plan, all risks, benefits, and alternatives have been provided, discussed and informed consent has been obtained with: patient and spouse/significant other.

## 2018-11-05 NOTE — OP NOTE
SHOULDER MANIPULATION  Procedure Note    Name:    Carlos Zapata  YOB: 1950  Date of surgery:   11/5/2018    Pre-op Diagnosis:   Adhesive capsulitis of right shoulder [M75.01]  Impingement syndrome of right shoulder [M75.41]  Acute pain of right shoulder [M25.511]    Post-op Diagnosis:    Post-Op Diagnosis Codes:     * Adhesive capsulitis of right shoulder [M75.01]     * Impingement syndrome of right shoulder [M75.41]     * Acute pain of right shoulder [M25.511]    Procedure:  Procedure(s):  SHOULDER MANIPULATION right    Surgeon:  Surgeon(s):  Anselmo Hamilton MD    ASSISTANT:  Berkley Lima CSA    Anesthesia: General    Staff:   Circulator: Odette Valdes RN  Scrub Person: Sully Freeman  Assistant: Berkley Lima CSA    Estimated Blood Loss: none    Specimens:                None      Drains:  none    Findings:  As below    Complications: None    IMPLANTS:   Nothing was implanted during the procedure      PROCEDURE:    Consent was obtained the patient was taken to the operating room and once adequate anesthesia was obtained the surgical timeout was performed.  The right upper extremity was then manipulated to break significant adhesions.  Initially the patient could only achieve about 80° of abduction and forward flexion while asleep.  We were able to manipulate to approximately 150° of flexion and 150° of abduction.  Internal rotation was approximately 50-60° and external rotation was partially 60-70°.  Patient tolerated the procedure very well and was taken back to same day surgery in good condition.  He tolerated the procedure very well.      Anselmo Hamilton MD     Date: 11/5/2018  Time: 7:33 AM

## 2018-11-05 NOTE — ANESTHESIA POSTPROCEDURE EVALUATION
Patient: Carlos Zapata    Procedure Summary     Date:  11/05/18 Room / Location:  [facility] OR [address] [place] [address] OR    Anesthesia Start:  0701 Anesthesia Stop:  0729    Procedure:  SHOULDER MANIPULATION (Right Shoulder) Diagnosis:       Adhesive capsulitis of right shoulder      Impingement syndrome of right shoulder      Acute pain of right shoulder      (Adhesive capsulitis of right shoulder [M75.01])      (Impingement syndrome of right shoulder [M75.41])      (Acute pain of right shoulder [M25.511])    Surgeon:  Anselmo Hamilton MD Provider:  Angel Jordan MD    Anesthesia Type:  general ASA Status:  3          Anesthesia Type: general  Last vitals  BP   158/86 (11/05/18 0603)   Temp   97 °F (36.1 °C) (11/05/18 0603)   Pulse   70 (11/05/18 0603)   Resp   18 (11/05/18 0603)     SpO2   100 % (11/05/18 0603)     Post Anesthesia Care and Evaluation    Patient location during evaluation: bedside  Patient participation: complete - patient participated  Level of consciousness: awake and awake and alert  Pain score: 2  Pain management: satisfactory to patient  Airway patency: patent  Anesthetic complications: No anesthetic complications  PONV Status: none  Cardiovascular status: acceptable and stable  Respiratory status: acceptable, room air and spontaneous ventilation  Hydration status: acceptable

## 2018-11-05 NOTE — H&P (VIEW-ONLY)
Carlos Zapata is a 68 y.o. male is s/p       Chief Complaint   Patient presents with   • Right Shoulder - Follow-up       HISTORY OF PRESENT ILLNESS: f/u right shoulder, surgery done on 9/15/2018   physical therapy at [facility]. Patient continues to have pain in shoulder he states that he doesn't have to be doing any activity for pain to start.   Having pain  Not improving with PT.  Stiff and stiff and not getting better.  Steroid and NSAIDS has not helped pain or motion    Past Medical History:   Diagnosis Date   • Anesthesia     difficult to wake when was child with tonsil surgery   • Arthritis    • Diabetes mellitus (CMS/HCC)    • Hx of heart artery stent 2000   • Hypertension    • Shoulder pain, right    • Wears glasses        No Known Allergies    Current Outpatient Prescriptions on File Prior to Visit   Medication Sig   • acetaminophen (TYLENOL) 500 MG tablet Take 500 mg by mouth Every 6 (Six) Hours As Needed for Mild Pain .   • aspirin 325 MG tablet Take 325 mg by mouth Every Night. Last dose 8-28-18 stopped for surgery   • Canagliflozin (INVOKANA) 100 MG tablet Take 100 mg by mouth Daily.   • ezetimibe (ZETIA) 10 MG tablet Take 10 mg by mouth Daily.   • glimepiride (AMARYL) 4 MG tablet Take 4 mg by mouth Every Night.   • hydrochlorothiazide (HYDRODIURIL) 12.5 MG tablet Take 12.5 mg by mouth Daily.   • lisinopril (PRINIVIL,ZESTRIL) 10 MG tablet Take 10 mg by mouth Daily.   • loratadine (CLARITIN) 10 MG tablet Take 10 mg by mouth As Needed.   • meloxicam (MOBIC) 15 MG tablet Take 1 tablet by mouth Daily for 30 days.   • metFORMIN (GLUCOPHAGE) 1000 MG tablet Take 1,000 mg by mouth 2 (Two) Times a Day With Meals.   • nitroglycerin (NITROSTAT) 0.4 MG SL tablet Place 0.4 mg under the tongue Every 5 (Five) Minutes As Needed for Chest Pain. Take no more than 3 doses in 15 minutes.   • oxyCODONE-acetaminophen (PERCOCET) 7.5-325 MG per tablet Take 1 tablet by mouth At Night As Needed for Moderate Pain .   •  simvastatin (ZOCOR) 80 MG tablet Take 80 mg by mouth Every Night.   • sotalol (BETAPACE) 80 MG tablet Take 80 mg by mouth 2 (Two) Times a Day.   • tadalafil (CIALIS) 20 MG tablet Take 20 mg by mouth Daily As Needed for erectile dysfunction.     No current facility-administered medications on file prior to visit.        Past Surgical History:   Procedure Laterality Date   • KNEE SURGERY Right 2015?   • SHOULDER ARTHROSCOPY Right 9/5/2018    Procedure: ARTHROSCOPY OF RIGHT SHOULDER WITH SUBACROMIAL DECOMPRESSION /FLORESITA AND EVALUATION OF ROTATOR CUFF. TENOTOMY     -   REGIONAL BLOCK;  Surgeon: Anselmo Hamilton MD;  Location: [facility];  Service: Orthopedics   • SHOULDER SURGERY Left 2015?   • TONSILLECTOMY  1958       Family History   Problem Relation Age of Onset   • Hypertension Other    • Diabetes Other        Social History     Social History   • Marital status:      Spouse name: N/A   • Number of children: N/A   • Years of education: N/A     Occupational History   • Not on file.     Social History Main Topics   • Smoking status: Never Smoker   • Smokeless tobacco: Never Used   • Alcohol use No   • Drug use: No   • Sexual activity: Not on file     Other Topics Concern   • Not on file     Social History Narrative   • No narrative on file               No fevers or chills.  No nausea or vomiting.      PHYSICAL EXAMINATION:       Carlos Zapata is a 68 y.o. male    Patient is awake and alert, answers questions appropriately and is in no apparent distress.  Heart:  Regular rate  Lungs: clear to auscultation  Abdoment:  Soft, nontender, nondistended.    GAIT:     [x]  Normal  []  Antalgic    Assistive device: [x]  None  []  Walker     []  Crutches  []  Cane     []  Wheelchair  []  Stretcher    Left Shoulder Exam     Range of Motion   Active Abduction: 90   Forward Flexion: 90     Muscle Strength   Abduction: 4/5   Supraspinatus: 4/5     Tests   Hawkin's test: negative    Other   Erythema:  absent  Sensation: normal  Pulse: present                     ASSESSMENT:    Diagnoses and all orders for this visit:    Adhesive capsulitis of right shoulder  -     Case Request; Standing  -     Case Request    Acute pain of right shoulder  -     Case Request; Standing  -     Case Request    Impingement syndrome of right shoulder  -     Case Request; Standing  -     Case Request    Type 2 diabetes mellitus without complication, without long-term current use of insulin (CMS/[facility])    Other orders  -     Follow Anesthesia Guidelines / Standing Orders; Future  -     Follow Anesthesia Guidelines / Standing Orders; Standing  -     Verify NPO Status; Standing  -     Obtain informed consent (if not collected inpatient or PAT); Standing          PLAN    Still with stiffness and significant limitation with motion  Pain is better that before surgery  Still with limitations and not improving with PT    The patient voiced understanding of the risks, benefits, and alternative forms of treatment that were discussed and the patient consents to proceed with surgery.  All risks, benefits and alternatives were discussed.  Risks including to but not exclusive to anesthetic complications, including death, MI, CVA, infection, bleeding DVT, fracture, residual pain and need for future surgery.  This discussion was held with the patient by Anselmo Hamilton MD and all questions were answered.    Discussed shoulder manipulation left.    Patient's Body mass index is 25.59 kg/m². BMI is above normal parameters. Recommendations include: exercise counseling and nutrition counseling.    Return for Post-operative eval.    Anselmo Hamilton MD

## 2018-11-05 NOTE — INTERVAL H&P NOTE
H&P reviewed. The patient was examined and there are no changes to the H&P.     Stiffness, soreness, and limitation is in the RIGHT shoulder.    The patient voiced understanding of the risks, benefits, and alternative forms of treatment that were discussed and the patient consents to proceed with surgery.  All risks, benefits and alternatives were discussed.  Risks including to but not exclusive to anesthetic complications, including death, MI, CVA, infection, bleeding DVT, fracture, residual pain and need for future surgery.  This discussion was held with the patient by Anselmo Hamilton MD and all questions were answered.    11/05/18 at 6:52 AM by Anselmo Hamilton MD

## 2018-11-16 ENCOUNTER — OFFICE VISIT (OUTPATIENT)
Dept: ORTHOPEDIC SURGERY | Facility: CLINIC | Age: 68
End: 2018-11-16

## 2018-11-16 VITALS — HEIGHT: 74 IN | WEIGHT: 201 LBS | BODY MASS INDEX: 25.8 KG/M2

## 2018-11-16 DIAGNOSIS — M25.511 ACUTE PAIN OF RIGHT SHOULDER: ICD-10-CM

## 2018-11-16 DIAGNOSIS — M75.41 IMPINGEMENT SYNDROME OF RIGHT SHOULDER: Primary | ICD-10-CM

## 2018-11-16 DIAGNOSIS — M75.01 ADHESIVE CAPSULITIS OF RIGHT SHOULDER: ICD-10-CM

## 2018-11-16 PROCEDURE — 99024 POSTOP FOLLOW-UP VISIT: CPT | Performed by: NURSE PRACTITIONER

## 2018-11-16 NOTE — PROGRESS NOTES
Carlos Zapata is a 68 y.o. male is s/p       Chief Complaint   Patient presents with   • Right Shoulder - Post-op       HISTORY OF PRESENT ILLNESS: Patient being seen for right shoulder post-op. Right shoulder manipulation performed 11/5/18. Patient doing physical therapy at [facility] 3 days/week.        No Known Allergies      Current Outpatient Medications:   •  acetaminophen (TYLENOL) 500 MG tablet, Take 500 mg by mouth Every 6 (Six) Hours As Needed for Mild Pain ., Disp: , Rfl:   •  aspirin 325 MG tablet, Take 325 mg by mouth Every Night. Last dose 8-28-18 stopped for surgery, Disp: , Rfl:   •  Canagliflozin (INVOKANA) 100 MG tablet, Take 100 mg by mouth Daily., Disp: , Rfl:   •  ezetimibe (ZETIA) 10 MG tablet, Take 10 mg by mouth Daily., Disp: , Rfl:   •  glimepiride (AMARYL) 4 MG tablet, Take 4 mg by mouth Every Night., Disp: , Rfl:   •  hydrochlorothiazide (HYDRODIURIL) 12.5 MG tablet, Take 12.5 mg by mouth Daily., Disp: , Rfl:   •  lisinopril (PRINIVIL,ZESTRIL) 10 MG tablet, Take 10 mg by mouth Daily., Disp: , Rfl:   •  loratadine (CLARITIN) 10 MG tablet, Take 10 mg by mouth As Needed., Disp: , Rfl:   •  metFORMIN (GLUCOPHAGE) 1000 MG tablet, Take 1,000 mg by mouth 2 (Two) Times a Day With Meals., Disp: , Rfl:   •  nitroglycerin (NITROSTAT) 0.4 MG SL tablet, Place 0.4 mg under the tongue Every 5 (Five) Minutes As Needed for Chest Pain. Take no more than 3 doses in 15 minutes., Disp: , Rfl:   •  oxyCODONE-acetaminophen (PERCOCET) 7.5-325 MG per tablet, Take 1 tablet by mouth Every 6 (Six) Hours As Needed for Moderate Pain ., Disp: 40 tablet, Rfl: 0  •  simvastatin (ZOCOR) 80 MG tablet, Take 80 mg by mouth Every Night., Disp: , Rfl:   •  sotalol (BETAPACE) 80 MG tablet, Take 80 mg by mouth 2 (Two) Times a Day., Disp: , Rfl:   •  tadalafil (CIALIS) 20 MG tablet, Take 20 mg by mouth Daily As Needed for erectile dysfunction., Disp: , Rfl:     No fevers or chills.  No nausea or vomiting.      PHYSICAL  EXAMINATION:       Carlos Zapata is a 68 y.o. male    Patient is awake and alert, answers questions appropriately and is in no apparent distress.    GAIT:     [x]  Normal  []  Antalgic    Assistive device: [x]  None  []  Walker     []  Crutches  []  Cane     []  Wheelchair  []  Stretcher    Right Shoulder Exam     Other   Erythema: absent  Scars: absent  Sensation: normal  Pulse: present    Comments:    See PT note for motion and strength testing      Left Shoulder Exam   Left shoulder exam is normal.              No results found.        ASSESSMENT:    Diagnoses and all orders for this visit:    Impingement syndrome of right shoulder    Acute pain of right shoulder    Adhesive capsulitis of right shoulder          PLAN  Recommend continue progressive range of motion exercises and follow-up in 4-6 weeks for recheck.  Patient was instructed to continue PT and home exercises as directed.  No Follow-up on file.    Tan Rodriguez, APRN

## 2018-11-29 RX ORDER — MELOXICAM 15 MG/1
TABLET ORAL
Qty: 30 TABLET | Refills: 2 | Status: SHIPPED | OUTPATIENT
Start: 2018-11-29 | End: 2019-01-23

## 2018-12-12 ENCOUNTER — OFFICE VISIT (OUTPATIENT)
Dept: ORTHOPEDIC SURGERY | Facility: CLINIC | Age: 68
End: 2018-12-12

## 2018-12-12 VITALS — WEIGHT: 203 LBS | BODY MASS INDEX: 26.05 KG/M2 | HEIGHT: 74 IN

## 2018-12-12 DIAGNOSIS — M25.511 ACUTE PAIN OF RIGHT SHOULDER: Primary | ICD-10-CM

## 2018-12-12 DIAGNOSIS — M75.01 ADHESIVE CAPSULITIS OF RIGHT SHOULDER: ICD-10-CM

## 2018-12-12 DIAGNOSIS — M75.41 IMPINGEMENT SYNDROME OF RIGHT SHOULDER: ICD-10-CM

## 2018-12-12 PROCEDURE — 99024 POSTOP FOLLOW-UP VISIT: CPT | Performed by: ORTHOPAEDIC SURGERY

## 2018-12-12 RX ORDER — HYDROCODONE BITARTRATE AND ACETAMINOPHEN 7.5; 325 MG/1; MG/1
1 TABLET ORAL EVERY 6 HOURS PRN
Qty: 40 TABLET | Refills: 0
Start: 2018-12-12 | End: 2019-01-23

## 2018-12-12 NOTE — PROGRESS NOTES
Carlos Zapata is a 68 y.o. male is s/p       Chief Complaint   Patient presents with   • Right Shoulder - Follow-up       HISTORY OF PRESENT ILLNESS: f/u right shoulder, manipulation done on 11/5/2018.physical [facility] 3 days/[facility]. Patient requesting refill on pain meds.    still having pain.  Feels like his motion is getting a little better.    No Known Allergies      Current Outpatient Medications:   •  acetaminophen (TYLENOL) 500 MG tablet, Take 500 mg by mouth Every 6 (Six) Hours As Needed for Mild Pain ., Disp: , Rfl:   •  aspirin 325 MG tablet, Take 325 mg by mouth Every Night. Last dose 8-28-18 stopped for surgery, Disp: , Rfl:   •  Canagliflozin (INVOKANA) 100 MG tablet, Take 100 mg by mouth Daily., Disp: , Rfl:   •  ezetimibe (ZETIA) 10 MG tablet, Take 10 mg by mouth Daily., Disp: , Rfl:   •  glimepiride (AMARYL) 4 MG tablet, Take 4 mg by mouth Every Night., Disp: , Rfl:   •  hydrochlorothiazide (HYDRODIURIL) 12.5 MG tablet, Take 12.5 mg by mouth Daily., Disp: , Rfl:   •  lisinopril (PRINIVIL,ZESTRIL) 10 MG tablet, Take 10 mg by mouth Daily., Disp: , Rfl:   •  loratadine (CLARITIN) 10 MG tablet, Take 10 mg by mouth As Needed., Disp: , Rfl:   •  meloxicam (MOBIC) 15 MG tablet, TAKE 1 TABLET BY MOUTH DAILY, Disp: 30 tablet, Rfl: 2  •  metFORMIN (GLUCOPHAGE) 1000 MG tablet, Take 1,000 mg by mouth 2 (Two) Times a Day With Meals., Disp: , Rfl:   •  nitroglycerin (NITROSTAT) 0.4 MG SL tablet, Place 0.4 mg under the tongue Every 5 (Five) Minutes As Needed for Chest Pain. Take no more than 3 doses in 15 minutes., Disp: , Rfl:   •  oxyCODONE-acetaminophen (PERCOCET) 7.5-325 MG per tablet, Take 1 tablet by mouth Every 6 (Six) Hours As Needed for Moderate Pain ., Disp: 40 tablet, Rfl: 0  •  simvastatin (ZOCOR) 80 MG tablet, Take 80 mg by mouth Every Night., Disp: , Rfl:   •  sotalol (BETAPACE) 80 MG tablet, Take 80 mg by mouth 2 (Two) Times a Day., Disp: , Rfl:   •  tadalafil (CIALIS) 20 MG tablet,  Take 20 mg by mouth Daily As Needed for erectile dysfunction., Disp: , Rfl:     No fevers or chills.  No nausea or vomiting.      PHYSICAL EXAMINATION:       Carlos Zapata is a 68 y.o. male    Patient is awake and alert, answers questions appropriately and is in no apparent distress.        Right Shoulder Exam     Tenderness   Right shoulder tenderness location: diffusely tender.    Range of Motion   Active abduction: 90   Forward flexion: 90     Muscle Strength   Abduction: 4/5   Supraspinatus: 4/5     Other   Erythema: absent  Sensation: normal  Pulse: present                      ASSESSMENT:    Diagnoses and all orders for this visit:    Acute pain of right shoulder    Impingement syndrome of right shoulder    Adhesive capsulitis of right shoulder    Other orders  -     HYDROcodone-acetaminophen (NORCO) 7.5-325 MG per tablet; Take 1 tablet by mouth Every 6 (Six) Hours As Needed for Moderate Pain .          PLAN    Continue with HEP  Continue with stretching and strengthening as tolerated.  No restrictions  Continue with PT exercises.   Discussed possible dry needling.      Return in about 6 weeks (around 1/23/2019) for recheck.    Anselmo Hamilton MD

## 2019-01-23 ENCOUNTER — TELEPHONE (OUTPATIENT)
Dept: ORTHOPEDIC SURGERY | Facility: CLINIC | Age: 69
End: 2019-01-23

## 2019-01-23 ENCOUNTER — OFFICE VISIT (OUTPATIENT)
Dept: ORTHOPEDIC SURGERY | Facility: CLINIC | Age: 69
End: 2019-01-23

## 2019-01-23 VITALS — HEIGHT: 74 IN | BODY MASS INDEX: 26.18 KG/M2 | WEIGHT: 204 LBS

## 2019-01-23 DIAGNOSIS — M75.01 ADHESIVE CAPSULITIS OF RIGHT SHOULDER: Primary | ICD-10-CM

## 2019-01-23 DIAGNOSIS — M75.41 IMPINGEMENT SYNDROME OF RIGHT SHOULDER: ICD-10-CM

## 2019-01-23 DIAGNOSIS — M25.511 ACUTE PAIN OF RIGHT SHOULDER: ICD-10-CM

## 2019-01-23 PROCEDURE — 99213 OFFICE O/P EST LOW 20 MIN: CPT | Performed by: ORTHOPAEDIC SURGERY

## 2019-01-23 RX ORDER — NABUMETONE 750 MG/1
750 TABLET, FILM COATED ORAL 2 TIMES DAILY
Qty: 60 TABLET | Refills: 1 | Status: SHIPPED | OUTPATIENT
Start: 2019-01-23 | End: 2019-03-19 | Stop reason: SDUPTHER

## 2019-01-23 NOTE — PROGRESS NOTES
"Carlos Zapata is a 68 y.o. male returns for     Chief Complaint   Patient presents with   • Right Shoulder - Follow-up, Pain       HISTORY OF PRESENT ILLNESS: f/u right shoulder pain. Patient states that he stopped going to physical therapy it was starting to not help, he wants to save visits in case he needs them in the future, he is currently doing home exercises.   Still having pain in shoulder and motion is not really improving.  Still having dull achy pain at night.  Ran out of meloxicam but didn't really notice a difference when he didn't take it.     CONCURRENT MEDICAL HISTORY:    The following portions of the patient's history were reviewed and updated as appropriate: allergies, current medications, past family history, past medical history, past social history, past surgical history and problem list.     ROS  No fevers or chills.  No chest pain or shortness of air.  No GI or  disturbances.    PHYSICAL EXAMINATION:       Ht 188 cm (74\")   Wt 92.5 kg (204 lb)   BMI 26.19 kg/m²     Physical Exam   Constitutional: He is oriented to person, place, and time. He appears well-developed and well-nourished.   Neurological: He is alert and oriented to person, place, and time.   Psychiatric: He has a normal mood and affect. His behavior is normal. Judgment and thought content normal.       GAIT:     [x]  Normal  []  Antalgic    Assistive device: [x]  None  []  Walker     []  Crutches  []  Cane     []  Wheelchair  []  Stretcher    Right Shoulder Exam     Tenderness   Right shoulder tenderness location: diffusely tender.    Range of Motion   Active abduction: 90   Forward flexion: 90     Muscle Strength   Abduction: 4/5   Supraspinatus: 4/5     Other   Erythema: absent  Sensation: normal  Pulse: present                        ASSESSMENT:    Diagnoses and all orders for this visit:    Adhesive capsulitis of right shoulder  -     MRI Shoulder Right Without Contrast; Future    Acute pain of right shoulder  -    "  MRI Shoulder Right Without Contrast; Future    Impingement syndrome of right shoulder  -     MRI Shoulder Right Without Contrast; Future          PLAN    Continues to have pain and limited motion 4 months after surgery and 2 months after manipulation.  Needs MRI to assess for changes postop that may explain difficulties.  Patient had prior surgery on left shoulder without any difficulties postop.  F/u after MRI.    Patient's Body mass index is 26.19 kg/m². BMI is within normal parameters. No follow-up required..    Return for recheck for MRI results.    Anselmo Hamilton MD

## 2019-01-23 NOTE — TELEPHONE ENCOUNTER
RADHA CALLED AND STATED THAT HIS INSURANCE WILL NOT COVER RELAFEN, LATOYA SAID TO HAVE YOU DO A PA DUE TO HIM ALREADY TAKING MOBIC AND IT NOT HELPING

## 2019-02-04 ENCOUNTER — HOSPITAL ENCOUNTER (OUTPATIENT)
Dept: MRI IMAGING | Facility: HOSPITAL | Age: 69
Discharge: HOME | End: 2019-02-04
Attending: ORTHOPAEDIC SURGERY | Admitting: ORTHOPAEDIC SURGERY

## 2019-02-04 DIAGNOSIS — M75.41 IMPINGEMENT SYNDROME OF RIGHT SHOULDER: ICD-10-CM

## 2019-02-04 DIAGNOSIS — M25.511 ACUTE PAIN OF RIGHT SHOULDER: ICD-10-CM

## 2019-02-04 DIAGNOSIS — M75.01 ADHESIVE CAPSULITIS OF RIGHT SHOULDER: ICD-10-CM

## 2019-02-04 PROCEDURE — 73221 MRI JOINT UPR EXTREM W/O DYE: CPT

## 2019-02-07 ENCOUNTER — OFFICE VISIT (OUTPATIENT)
Dept: ORTHOPEDIC SURGERY | Facility: CLINIC | Age: 69
End: 2019-02-07

## 2019-02-07 VITALS — WEIGHT: 205 LBS | HEIGHT: 75 IN | BODY MASS INDEX: 25.49 KG/M2

## 2019-02-07 DIAGNOSIS — M75.41 IMPINGEMENT SYNDROME OF RIGHT SHOULDER: ICD-10-CM

## 2019-02-07 DIAGNOSIS — M25.511 ACUTE PAIN OF RIGHT SHOULDER: Primary | ICD-10-CM

## 2019-02-07 DIAGNOSIS — M75.01 ADHESIVE CAPSULITIS OF RIGHT SHOULDER: ICD-10-CM

## 2019-02-07 PROCEDURE — 99213 OFFICE O/P EST LOW 20 MIN: CPT | Performed by: ORTHOPAEDIC SURGERY

## 2019-02-07 NOTE — PROGRESS NOTES
"Carlos Zapata is a 68 y.o. male returns for     Chief Complaint   Patient presents with   • Right Shoulder - Follow-up       HISTORY OF PRESENT ILLNESS: Patient is here today for MRI results of the right shoulder. MRI was performed at [facility] on 02/04/2019.Patient is not currenlty doing PT but he has in the past. No new problems or symptoms since his last visit here.   Pain is better with the NSAIDS       CONCURRENT MEDICAL HISTORY:    The following portions of the patient's history were reviewed and updated as appropriate: allergies, current medications, past family history, past medical history, past social history, past surgical history and problem list.     ROS  No fevers or chills.  No chest pain or shortness of air.  No GI or  disturbances.    PHYSICAL EXAMINATION:       Ht 189.2 cm (74.5\")   Wt 93 kg (205 lb)   BMI 25.97 kg/m²     Physical Exam   Constitutional: He is oriented to person, place, and time. He appears well-developed and well-nourished.   Neurological: He is alert and oriented to person, place, and time.   Psychiatric: He has a normal mood and affect. His behavior is normal. Judgment and thought content normal.       GAIT:     [x]  Normal  []  Antalgic    Assistive device: [x]  None  []  Walker     []  Crutches  []  Cane     []  Wheelchair  []  Stretcher    Right Shoulder Exam     Tenderness   The patient is experiencing no tenderness.    Range of Motion   Active abduction: 90   Forward flexion: 90     Muscle Strength   The patient has normal right shoulder strength.    Tests   Moyer test: negative  Impingement: negative    Other   Erythema: absent  Sensation: normal  Pulse: present              Mri Shoulder Right Without Contrast    Result Date: 2/4/2019  Narrative: MRI right shoulder. History:  right shoulder pain. TECHNIQUE: Multiplanar multisequence noncontrast images right shoulder. There is acromioclavicular joint arthrosis. There is increased signal intensity within " the acromioclavicular joint suggesting active inflammatory, arthritic process. There is capsular hypertrophy projecting superiorly, geyser type phenomenon. There is however no appreciable underlying impingement. Very subtle partial-thickness humeral surface tear posterior aspect supraspinous tendon. Supraspinatous and infraspinatus tendons are otherwise unremarkable. No full-thickness tear. Normal subscapularis. Small fluid collection anterior to the subscapularis, bursitis. Small fluid collection subacromial bursa, bursitis.  Normal intact biceps tendon. Arthritic changes glenohumeral joint with joint space narrowing. Degenerative type tear posterior aspect superior glenoid labrum.     Impression: CONCLUSION: Acromioclavicular joint arthrosis with increased signal intensity within the acromioclavicular  joint indicating active arthritic, inflammatory process. Capsular hypertrophy projecting superiorly, geyser phenomenon. No significant underlying impingement. Small amount of fluid in the subacromial bursa, bursitis. Small partial-thickness bursal surface tear posterior aspect supraspinatous tendon. Supraspinatous and infraspinatus tendons are otherwise unremarkable. No full-thickness tear. Normal subscapularis tendon. Small adjacent fluid collection anterior to the subscapularis, bursitis. Degenerative changes with joint space narrowing glenohumeral joint. Degenerative type tear posterior aspect of the superior glenoid labrum. MRI right shoulder is otherwise remarkable. Electronically signed by:  Erasto Anderson MD  2/4/2019 2:13 PM [facility] Workstation: [id number]            ASSESSMENT:    Diagnoses and all orders for this visit:    Acute pain of right shoulder    Impingement syndrome of right shoulder    Adhesive capsulitis of right shoulder          PLAN    Activity as tolerated  No definitive surgical intervention  continue with HEP  Continue to use pulley's and stretches  Discussed possible visit to another surgeon for  second opinion        Patient's Body mass index is 25.97 kg/m². BMI is within normal parameters. No follow-up required..  Return in about 6 weeks (around 3/21/2019) for recheck.    Anselmo Hamilton MD

## 2019-03-19 RX ORDER — NABUMETONE 750 MG/1
750 TABLET, FILM COATED ORAL 2 TIMES DAILY
Qty: 60 TABLET | Refills: 1 | Status: SHIPPED | OUTPATIENT
Start: 2019-03-19 | End: 2019-04-18 | Stop reason: SDUPTHER

## 2019-03-27 ENCOUNTER — OFFICE VISIT (OUTPATIENT)
Dept: ORTHOPEDIC SURGERY | Facility: CLINIC | Age: 69
End: 2019-03-27

## 2019-03-27 VITALS — BODY MASS INDEX: 25.61 KG/M2 | WEIGHT: 206 LBS | HEIGHT: 75 IN

## 2019-03-27 DIAGNOSIS — M25.511 ACUTE PAIN OF RIGHT SHOULDER: Primary | ICD-10-CM

## 2019-03-27 DIAGNOSIS — M75.41 IMPINGEMENT SYNDROME OF RIGHT SHOULDER: ICD-10-CM

## 2019-03-27 DIAGNOSIS — M75.01 ADHESIVE CAPSULITIS OF RIGHT SHOULDER: ICD-10-CM

## 2019-03-27 PROCEDURE — 99213 OFFICE O/P EST LOW 20 MIN: CPT | Performed by: ORTHOPAEDIC SURGERY

## 2019-03-27 NOTE — PROGRESS NOTES
"Carlos Zapata is a 68 y.o. male returns for     Chief Complaint   Patient presents with   • Right Shoulder - Follow-up, Pain       HISTORY OF PRESENT ILLNESS: f/u right shoulder pain. Patient states that shoulder is a little better.   Doing HEP  Nabumetone helps  Pain is improved  Slowly progressing  No numbness or tingling.       CONCURRENT MEDICAL HISTORY:    The following portions of the patient's history were reviewed and updated as appropriate: allergies, current medications, past family history, past medical history, past social history, past surgical history and problem list.     ROS  No fevers or chills.  No chest pain or shortness of air.  No GI or  disturbances.    PHYSICAL EXAMINATION:       Ht 189.2 cm (74.5\")   Wt 93.4 kg (206 lb)   BMI 26.10 kg/m²     Physical Exam   Constitutional: He is oriented to person, place, and time. He appears well-developed and well-nourished.   Neurological: He is alert and oriented to person, place, and time.   Psychiatric: He has a normal mood and affect. His behavior is normal. Judgment and thought content normal.       GAIT:     [x]  Normal  []  Antalgic    Assistive device: [x]  None  []  Walker     []  Crutches  []  Cane     []  Wheelchair  []  Stretcher    Right Shoulder Exam     Tenderness   The patient is experiencing no tenderness.    Range of Motion   Active abduction: 110   Forward flexion: 110   Internal rotation 0 degrees: Sacrum     Muscle Strength   The patient has normal right shoulder strength.    Tests   Moyer test: negative  Impingement: negative    Other   Erythema: absent  Sensation: normal  Pulse: present                        ASSESSMENT:    Diagnoses and all orders for this visit:    Acute pain of right shoulder    Impingement syndrome of right shoulder    Adhesive capsulitis of right shoulder          PLAN    Activity as tolerated  Continue nabumetone  Continue HEP  Slowly progress activity as tolerated.  Recheck 8 weeks.    Return in " about 8 weeks (around 5/22/2019) for recheck.    Anselmo Hamilton MD

## 2019-04-19 RX ORDER — NABUMETONE 750 MG/1
750 TABLET, FILM COATED ORAL 2 TIMES DAILY
Qty: 60 TABLET | Refills: 1 | Status: SHIPPED | OUTPATIENT
Start: 2019-04-19 | End: 2019-05-13 | Stop reason: SDUPTHER

## 2019-05-13 RX ORDER — NABUMETONE 750 MG/1
750 TABLET, FILM COATED ORAL 2 TIMES DAILY
Qty: 60 TABLET | Refills: 1 | Status: SHIPPED | OUTPATIENT
Start: 2019-05-13

## 2019-05-22 ENCOUNTER — OFFICE VISIT (OUTPATIENT)
Dept: ORTHOPEDIC SURGERY | Facility: CLINIC | Age: 69
End: 2019-05-22

## 2019-05-22 VITALS — HEIGHT: 75 IN | BODY MASS INDEX: 25.61 KG/M2 | WEIGHT: 206 LBS

## 2019-05-22 DIAGNOSIS — M25.511 ACUTE PAIN OF RIGHT SHOULDER: ICD-10-CM

## 2019-05-22 DIAGNOSIS — M75.01 ADHESIVE CAPSULITIS OF RIGHT SHOULDER: Primary | ICD-10-CM

## 2019-05-22 DIAGNOSIS — M75.41 IMPINGEMENT SYNDROME OF RIGHT SHOULDER: ICD-10-CM

## 2019-05-22 PROCEDURE — 99213 OFFICE O/P EST LOW 20 MIN: CPT | Performed by: ORTHOPAEDIC SURGERY

## 2019-05-22 RX ORDER — ROSUVASTATIN CALCIUM 20 MG/1
20 TABLET, COATED ORAL EVERY EVENING
Refills: 2 | COMMUNITY
Start: 2019-04-30

## 2019-05-22 NOTE — PROGRESS NOTES
"Carlos Zapata is a 69 y.o. male returns for     Chief Complaint   Patient presents with   • Right Shoulder - Follow-up       HISTORY OF PRESENT ILLNESS: f/u right shoulder pain. No new problems, patient still doing HEP.   Doing everything he needs to do but not necessarily everything that he wants to do.  Not having pain  No numbness or tingling.     CONCURRENT MEDICAL HISTORY:    The following portions of the patient's history were reviewed and updated as appropriate: allergies, current medications, past family history, past medical history, past social history, past surgical history and problem list.     ROS  No fevers or chills.  No chest pain or shortness of air.  No GI or  disturbances.    PHYSICAL EXAMINATION:       Ht 189.2 cm (74.5\")   Wt 93.4 kg (206 lb)   BMI 26.10 kg/m²     Physical Exam   Constitutional: He is oriented to person, place, and time. He appears well-developed and well-nourished.   Neurological: He is alert and oriented to person, place, and time.   Psychiatric: He has a normal mood and affect. His behavior is normal. Judgment and thought content normal.       GAIT:     [x]  Normal  []  Antalgic    Assistive device: [x]  None  []  Walker     []  Crutches  []  Cane     []  Wheelchair  []  Stretcher    Right Shoulder Exam     Tenderness   The patient is experiencing no tenderness.    Range of Motion   Active abduction: 110   Forward flexion: 120   Internal rotation 0 degrees: L5     Muscle Strength   The patient has normal right shoulder strength.    Tests   Moyer test: negative  Impingement: negative    Other   Erythema: absent  Sensation: normal  Pulse: present                        ASSESSMENT:    Diagnoses and all orders for this visit:    Adhesive capsulitis of right shoulder    Acute pain of right shoulder    Impingement syndrome of right shoulder    Other orders  -     rosuvastatin (CRESTOR) 20 MG tablet; Take 20 mg by mouth Every Evening.          PLAN    Activity as " tolerated  No restrictions  Continue strength and conditioning as tolerated.  Not having pain.  Discussed recheck if needed.    Patient's Body mass index is 26.1 kg/m². BMI is within normal parameters. No follow-up required..      Return if symptoms worsen or fail to improve, for recheck.    Anselmo Hamilton MD

## 2019-09-17 DIAGNOSIS — M75.41 IMPINGEMENT SYNDROME OF RIGHT SHOULDER: ICD-10-CM

## 2019-09-17 DIAGNOSIS — M75.01 ADHESIVE CAPSULITIS OF RIGHT SHOULDER: ICD-10-CM

## 2019-09-17 DIAGNOSIS — M25.511 ACUTE PAIN OF RIGHT SHOULDER: Primary | ICD-10-CM

## 2019-09-17 RX ORDER — NABUMETONE 750 MG/1
TABLET, FILM COATED ORAL
Qty: 60 TABLET | Refills: 1 | Status: SHIPPED | OUTPATIENT
Start: 2019-09-17 | End: 2022-11-07 | Stop reason: SDUPTHER

## 2021-03-03 ENCOUNTER — IMMUNIZATION (OUTPATIENT)
Dept: VACCINE CLINIC | Facility: HOSPITAL | Age: 71
End: 2021-03-03

## 2021-03-03 PROCEDURE — 0001A: CPT | Performed by: THORACIC SURGERY (CARDIOTHORACIC VASCULAR SURGERY)

## 2021-03-03 PROCEDURE — 91300 HC SARSCOV02 VAC 30MCG/0.3ML IM: CPT | Performed by: THORACIC SURGERY (CARDIOTHORACIC VASCULAR SURGERY)

## 2021-03-24 ENCOUNTER — IMMUNIZATION (OUTPATIENT)
Dept: VACCINE CLINIC | Facility: HOSPITAL | Age: 71
End: 2021-03-24

## 2021-03-24 PROCEDURE — 91300 HC SARSCOV02 VAC 30MCG/0.3ML IM: CPT | Performed by: THORACIC SURGERY (CARDIOTHORACIC VASCULAR SURGERY)

## 2021-03-24 PROCEDURE — 0002A: CPT | Performed by: THORACIC SURGERY (CARDIOTHORACIC VASCULAR SURGERY)

## 2022-11-07 ENCOUNTER — LAB (OUTPATIENT)
Dept: LAB | Facility: OTHER | Age: 72
End: 2022-11-07

## 2022-11-07 ENCOUNTER — OFFICE VISIT (OUTPATIENT)
Dept: FAMILY MEDICINE CLINIC | Facility: CLINIC | Age: 72
End: 2022-11-07

## 2022-11-07 VITALS — BODY MASS INDEX: 26.44 KG/M2 | WEIGHT: 206 LBS | HEIGHT: 74 IN

## 2022-11-07 DIAGNOSIS — J06.9 UPPER RESPIRATORY TRACT INFECTION, UNSPECIFIED TYPE: Primary | ICD-10-CM

## 2022-11-07 DIAGNOSIS — U07.1 UPPER RESPIRATORY TRACT INFECTION DUE TO COVID-19 VIRUS: Primary | ICD-10-CM

## 2022-11-07 DIAGNOSIS — J06.9 UPPER RESPIRATORY TRACT INFECTION DUE TO COVID-19 VIRUS: Primary | ICD-10-CM

## 2022-11-07 LAB
FLUAV AG UPPER RESP QL IA.RAPID: NOT DETECTED
FLUBV AG UPPER RESP QL IA.RAPID: NOT DETECTED
SARS-COV-2 AG RESP QL IA.RAPID: DETECTED

## 2022-11-07 PROCEDURE — G2025 DIS SITE TELE SVCS RHC/FQHC: HCPCS | Performed by: PHYSICIAN ASSISTANT

## 2022-11-07 PROCEDURE — 87428 SARSCOV & INF VIR A&B AG IA: CPT | Performed by: PHYSICIAN ASSISTANT

## 2022-11-07 PROCEDURE — 87426 SARSCOV CORONAVIRUS AG IA: CPT | Performed by: PHYSICIAN ASSISTANT

## 2022-11-07 RX ORDER — SOTALOL HYDROCHLORIDE 80 MG/1
80 TABLET ORAL 2 TIMES DAILY
COMMUNITY
Start: 2022-10-20

## 2022-11-07 RX ORDER — FLUTICASONE PROPIONATE 50 MCG
2 SPRAY, SUSPENSION (ML) NASAL DAILY
Qty: 18 G | Refills: 0 | Status: SHIPPED | OUTPATIENT
Start: 2022-11-07

## 2022-11-07 RX ORDER — ALBUTEROL SULFATE 90 UG/1
2 AEROSOL, METERED RESPIRATORY (INHALATION) EVERY 4 HOURS PRN
Qty: 18 G | Refills: 0 | Status: SHIPPED | OUTPATIENT
Start: 2022-11-07

## 2022-11-07 RX ORDER — PREDNISONE 20 MG/1
20 TABLET ORAL DAILY
Qty: 5 TABLET | Refills: 0 | Status: SHIPPED | OUTPATIENT
Start: 2022-11-07

## 2022-11-07 NOTE — PROGRESS NOTES
Subjective   Carlos Zapata is a 72 y.o. male.   You have chosen to receive care through a telephone visit. Do you consent to use a telephone visit for your medical care today? Yes This visit has been rescheduled as a phone visit to comply with patient safety concerns in accordance with CDC recommendations. Total time of discussion was 15 minutes.   Patient is at his home and I am in my office for the duration of today's visit.     URI   This is a new problem. The current episode started in the past 7 days. The problem has been unchanged. There has been no fever. Associated symptoms include congestion, coughing, rhinorrhea and wheezing. Pertinent negatives include no abdominal pain, chest pain, diarrhea, dysuria, ear pain, headaches, joint pain, joint swelling, nausea, neck pain, plugged ear sensation, rash, sinus pain, sneezing, sore throat, swollen glands or vomiting.      Pt presents today with a c/c of feeling unwell x 6 days. He admits to rhinorrhea, nasal congestion, chills, productive cough of white sputum, wheezing. He denies fever, otalgia, headaches, n/v/d, dyspnea, sore throat. He does report he feels like his symptoms have been gradually improving. He does report his wife was recently diagnosed with covid-19 earlier today. He reports taking otc robitussin DM, mucinex. He does report pmh of htn, hld, T2DM, CAD. He is vaccinated for covid-19.     The following portions of the patient's history were reviewed and updated as appropriate: allergies, current medications, past family history, past medical history, past social history, past surgical history and problem list.    Review of Systems   Constitutional: Positive for fatigue. Negative for activity change, appetite change, chills, diaphoresis, fever, unexpected weight gain and unexpected weight loss.   HENT: Positive for congestion, postnasal drip and rhinorrhea. Negative for dental problem, drooling, ear discharge, ear pain, facial swelling,  hearing loss, mouth sores, nosebleeds, sinus pressure, sneezing, sore throat, swollen glands, tinnitus, trouble swallowing and voice change.    Eyes: Negative for blurred vision, double vision and visual disturbance.   Respiratory: Positive for cough and wheezing. Negative for apnea, choking, chest tightness, shortness of breath and stridor.    Cardiovascular: Negative for chest pain, palpitations and leg swelling.   Gastrointestinal: Negative for abdominal distention, abdominal pain, constipation, diarrhea, nausea, vomiting, GERD and indigestion.   Endocrine: Negative.    Genitourinary: Negative for dysuria.   Musculoskeletal: Negative for joint pain and neck pain.   Skin: Negative.  Negative for rash.   Neurological: Negative for dizziness, weakness, headache and confusion.   Psychiatric/Behavioral: Negative.        Objective   Virtual Visit Physical Exam     PE limited d/t telehealth encounter.     Assessment & Plan   Diagnoses and all orders for this visit:    1. Upper respiratory tract infection due to COVID-19 virus (Primary)  -     albuterol sulfate  (90 Base) MCG/ACT inhaler; Inhale 2 puffs Every 4 (Four) Hours As Needed for Wheezing.  Dispense: 18 g; Refill: 0  -     fluticasone (Flonase) 50 MCG/ACT nasal spray; 2 sprays into the nostril(s) as directed by provider Daily.  Dispense: 18 g; Refill: 0  -     predniSONE (DELTASONE) 20 MG tablet; Take 1 tablet by mouth Daily.  Dispense: 5 tablet; Refill: 0      Covid-19 and influenza testing are completed today. Covid-19 testing is positive. Influenza testing is negative. Unfortunately he is out of the window for paxlovid. He may use mucinex, flonase, antihistamine of choice, and tylenol otc prn. Advised to maintain adequate hydration and increase fluid intake. Continue supportive measures. He will begin albuterol inhaler QID PRN for wheezing or dyspnea. He will begin prednisone as above. Advised patient to closely monitor glucose readings and follow low  carb diet with prednisone use. we discussed concerning s/s to immediately RTC or present to ER for further evaluation and treatment including but not limited to chest pain, dyspnea, syncope/presyncope, ams/confusion, poor I&O. Pt verbalized understanding.     Patient educated to follow up sooner than next scheduled appointment if symptoms worsen or do not improve within 3 days. Patient stated understanding and has agreed with plan of care. After visit summary was printed and given to patient.         This document has been electronically signed by Dorys Agarwal PA-C on November 7, 2022 15:58 CST,.

## (undated) DEVICE — SOL IRR LACT RNG BG 3000ML

## (undated) DEVICE — GLV SURG SENSICARE PI PF LF 7 GRN STRL

## (undated) DEVICE — GLV SURG TRIUMPH LT PF LTX 8 STRL

## (undated) DEVICE — APPL CHLORAPREP W/TINT 26ML ORNG

## (undated) DEVICE — Device

## (undated) DEVICE — GLV SURG TRIUMPH LT PF LTX 6.5 STRL

## (undated) DEVICE — BLD SHAVER RESECTOR SERR AGGR 5MM 13CM

## (undated) DEVICE — GOWN,PREVENTION PLUS,XLONG/XLARGE,STRL: Brand: MEDLINE

## (undated) DEVICE — SPNG GZ WOVN 4X4IN 12PLY 10/BX STRL

## (undated) DEVICE — TBG PUMP ARTHSCP MAIN AR6400 16FT

## (undated) DEVICE — GOWN,AURORA,NOREINF,RAGLAN,XL,STERILE: Brand: MEDLINE

## (undated) DEVICE — GLV SURG SENSICARE PI LF PF 7.5 GRN STRL

## (undated) DEVICE — GLV SURG TRIUMPH PF LTX 7 STRL

## (undated) DEVICE — PAD,ABDOMINAL,8"X10",ST,LF: Brand: MEDLINE

## (undated) DEVICE — ABL OPES COOLCUT ASPIR 3MM 90D

## (undated) DEVICE — GLV SURG SENSICARE ALOE LF PF SZ7.5 GRN

## (undated) DEVICE — STERILE POLYISOPRENE POWDER-FREE SURGICAL GLOVES WITH EMOLLIENT COATING: Brand: PROTEXIS

## (undated) DEVICE — CANN TWST IN 7CM 8.25MM ID

## (undated) DEVICE — DRSNG GZ CURAD XEROFORM NONADHS 5X9IN STRL

## (undated) DEVICE — SUT ETHLN 3-0 FS118IN 663H

## (undated) DEVICE — TP ELAS ELASTIKON ADHS 4IN 2.5YD

## (undated) DEVICE — PK SHLDR ARTHSCP 60